# Patient Record
Sex: MALE | Employment: FULL TIME | URBAN - METROPOLITAN AREA
[De-identification: names, ages, dates, MRNs, and addresses within clinical notes are randomized per-mention and may not be internally consistent; named-entity substitution may affect disease eponyms.]

---

## 2018-03-12 ENCOUNTER — HOSPITAL ENCOUNTER (OUTPATIENT)
Age: 33
Setting detail: OBSERVATION
Discharge: HOME | DRG: 139 | End: 2018-03-14
Attending: EMERGENCY MEDICINE | Admitting: INTERNAL MEDICINE
Payer: COMMERCIAL

## 2018-03-12 ENCOUNTER — APPOINTMENT (OUTPATIENT)
Dept: GENERAL RADIOLOGY | Age: 33
DRG: 139 | End: 2018-03-12
Attending: EMERGENCY MEDICINE
Payer: COMMERCIAL

## 2018-03-12 DIAGNOSIS — J18.9 COMMUNITY ACQUIRED PNEUMONIA, UNSPECIFIED LATERALITY: Primary | ICD-10-CM

## 2018-03-12 DIAGNOSIS — J10.1 INFLUENZA A: ICD-10-CM

## 2018-03-12 LAB
ANION GAP SERPL CALC-SCNC: 7 MMOL/L (ref 5–15)
BASOPHILS # BLD: 0 K/UL (ref 0–0.1)
BASOPHILS NFR BLD: 0 % (ref 0–1)
BUN SERPL-MCNC: 11 MG/DL (ref 6–20)
BUN/CREAT SERPL: 11 (ref 12–20)
CALCIUM SERPL-MCNC: 8.6 MG/DL (ref 8.5–10.1)
CHLORIDE SERPL-SCNC: 103 MMOL/L (ref 97–108)
CO2 SERPL-SCNC: 27 MMOL/L (ref 21–32)
CREAT SERPL-MCNC: 1.02 MG/DL (ref 0.7–1.3)
D DIMER PPP FEU-MCNC: 0.38 MG/L FEU (ref 0–0.65)
DIFFERENTIAL METHOD BLD: ABNORMAL
EOSINOPHIL # BLD: 0.1 K/UL (ref 0–0.4)
EOSINOPHIL NFR BLD: 1 % (ref 0–7)
ERYTHROCYTE [DISTWIDTH] IN BLOOD BY AUTOMATED COUNT: 12.9 % (ref 11.5–14.5)
GLUCOSE SERPL-MCNC: 114 MG/DL (ref 65–100)
HCT VFR BLD AUTO: 41.4 % (ref 36.6–50.3)
HGB BLD-MCNC: 14.5 G/DL (ref 12.1–17)
IMM GRANULOCYTES # BLD: 0 K/UL (ref 0–0.04)
IMM GRANULOCYTES NFR BLD AUTO: 0 % (ref 0–0.5)
LACTATE BLD-SCNC: 1.9 MMOL/L (ref 0.4–2)
LYMPHOCYTES # BLD: 0.6 K/UL (ref 0.8–3.5)
LYMPHOCYTES NFR BLD: 12 % (ref 12–49)
MCH RBC QN AUTO: 27.9 PG (ref 26–34)
MCHC RBC AUTO-ENTMCNC: 35 G/DL (ref 30–36.5)
MCV RBC AUTO: 79.8 FL (ref 80–99)
MONOCYTES # BLD: 0.7 K/UL (ref 0–1)
MONOCYTES NFR BLD: 14 % (ref 5–13)
NEUTS SEG # BLD: 3.8 K/UL (ref 1.8–8)
NEUTS SEG NFR BLD: 73 % (ref 32–75)
NRBC # BLD: 0 K/UL (ref 0–0.01)
NRBC BLD-RTO: 0 PER 100 WBC
PLATELET # BLD AUTO: 189 K/UL (ref 150–400)
PMV BLD AUTO: 10 FL (ref 8.9–12.9)
POTASSIUM SERPL-SCNC: 3.4 MMOL/L (ref 3.5–5.1)
RBC # BLD AUTO: 5.19 M/UL (ref 4.1–5.7)
RBC MORPH BLD: ABNORMAL
S PYO AG THROAT QL: NEGATIVE
SODIUM SERPL-SCNC: 137 MMOL/L (ref 136–145)
WBC # BLD AUTO: 5.2 K/UL (ref 4.1–11.1)

## 2018-03-12 PROCEDURE — 93005 ELECTROCARDIOGRAM TRACING: CPT

## 2018-03-12 PROCEDURE — 85379 FIBRIN DEGRADATION QUANT: CPT | Performed by: PHYSICIAN ASSISTANT

## 2018-03-12 PROCEDURE — 85025 COMPLETE CBC W/AUTO DIFF WBC: CPT | Performed by: PHYSICIAN ASSISTANT

## 2018-03-12 PROCEDURE — 74011636637 HC RX REV CODE- 636/637: Performed by: PHYSICIAN ASSISTANT

## 2018-03-12 PROCEDURE — 71046 X-RAY EXAM CHEST 2 VIEWS: CPT

## 2018-03-12 PROCEDURE — 94761 N-INVAS EAR/PLS OXIMETRY MLT: CPT

## 2018-03-12 PROCEDURE — 36415 COLL VENOUS BLD VENIPUNCTURE: CPT | Performed by: PHYSICIAN ASSISTANT

## 2018-03-12 PROCEDURE — 99285 EMERGENCY DEPT VISIT HI MDM: CPT

## 2018-03-12 PROCEDURE — 96374 THER/PROPH/DIAG INJ IV PUSH: CPT

## 2018-03-12 PROCEDURE — 74011250636 HC RX REV CODE- 250/636: Performed by: PHYSICIAN ASSISTANT

## 2018-03-12 PROCEDURE — 94640 AIRWAY INHALATION TREATMENT: CPT

## 2018-03-12 PROCEDURE — 74011000250 HC RX REV CODE- 250: Performed by: PHYSICIAN ASSISTANT

## 2018-03-12 PROCEDURE — 96375 TX/PRO/DX INJ NEW DRUG ADDON: CPT

## 2018-03-12 PROCEDURE — 83605 ASSAY OF LACTIC ACID: CPT

## 2018-03-12 PROCEDURE — 87880 STREP A ASSAY W/OPTIC: CPT

## 2018-03-12 PROCEDURE — 96361 HYDRATE IV INFUSION ADD-ON: CPT

## 2018-03-12 PROCEDURE — 87804 INFLUENZA ASSAY W/OPTIC: CPT | Performed by: PHYSICIAN ASSISTANT

## 2018-03-12 PROCEDURE — 80048 BASIC METABOLIC PNL TOTAL CA: CPT | Performed by: PHYSICIAN ASSISTANT

## 2018-03-12 PROCEDURE — 74011250637 HC RX REV CODE- 250/637: Performed by: PHYSICIAN ASSISTANT

## 2018-03-12 RX ORDER — KETOROLAC TROMETHAMINE 30 MG/ML
30 INJECTION, SOLUTION INTRAMUSCULAR; INTRAVENOUS
Status: COMPLETED | OUTPATIENT
Start: 2018-03-12 | End: 2018-03-12

## 2018-03-12 RX ORDER — AZITHROMYCIN 250 MG/1
250 TABLET, FILM COATED ORAL DAILY
Qty: 4 TAB | Refills: 0 | Status: SHIPPED | OUTPATIENT
Start: 2018-03-12 | End: 2018-03-16

## 2018-03-12 RX ORDER — ACETAMINOPHEN 325 MG/1
650 TABLET ORAL
Qty: 20 TAB | Refills: 0 | Status: SHIPPED | OUTPATIENT
Start: 2018-03-12 | End: 2018-03-26

## 2018-03-12 RX ORDER — IBUPROFEN 600 MG/1
600 TABLET ORAL
Qty: 20 TAB | Refills: 0 | Status: SHIPPED | OUTPATIENT
Start: 2018-03-12 | End: 2018-03-26

## 2018-03-12 RX ORDER — AZITHROMYCIN 250 MG/1
500 TABLET, FILM COATED ORAL
Status: COMPLETED | OUTPATIENT
Start: 2018-03-12 | End: 2018-03-12

## 2018-03-12 RX ORDER — PREDNISONE 20 MG/1
60 TABLET ORAL
Status: COMPLETED | OUTPATIENT
Start: 2018-03-12 | End: 2018-03-12

## 2018-03-12 RX ADMIN — ALBUTEROL SULFATE 1 DOSE: 2.5 SOLUTION RESPIRATORY (INHALATION) at 21:33

## 2018-03-12 RX ADMIN — KETOROLAC TROMETHAMINE 30 MG: 30 INJECTION, SOLUTION INTRAMUSCULAR at 22:48

## 2018-03-12 RX ADMIN — SODIUM CHLORIDE 1000 ML: 900 INJECTION, SOLUTION INTRAVENOUS at 22:59

## 2018-03-12 RX ADMIN — AZITHROMYCIN 500 MG: 250 TABLET, FILM COATED ORAL at 22:45

## 2018-03-12 RX ADMIN — SODIUM CHLORIDE 1000 ML: 900 INJECTION, SOLUTION INTRAVENOUS at 21:47

## 2018-03-12 RX ADMIN — PREDNISONE 60 MG: 20 TABLET ORAL at 21:47

## 2018-03-12 NOTE — IP AVS SNAPSHOT
[address] 
[phone] Patient: Jesus Jacob MRN: [id number] :1985 You are allergic to the following No active allergies Immunizations Administered for This Admission Name Date Influenza Vaccine (Quad) PF 3/14/2018 Recent Documentation Height Weight BMI Smoking Status 1.676 m 81.2 kg 28.89 kg/m2 Current Every Day Smoker Unresulted Labs-Please follow up with your PCP about these lab tests Order Current Status CULTURE, BLOOD, PAIRED Preliminary result Emergency Contacts  (Rel.) Home Phone Work Phone Mobile Phone None,None (Pt Representative) [phone] -- -- About your hospitalization You were admitted on:  2018 You last received care in the:  [facility] You were discharged on:  2018 Why you were hospitalized Your primary diagnosis was:  Sepsis (Hcc) Providers Seen During Your Hospitalization Provider Specialty Primary office phone Kyaw Aden MD Emergency Medicine [phone] India Rubin MD Internal Medicine [phone] Ariela Sandoval MD Hospitalist [phone] Your Primary Care Physician (PCP) Primary Care Physician Office Phone Office Fax NONE ** None ** ** None ** Follow-up Information Follow up With Details Comments Contact Info Orestes Spicer NP On 3/20/2018 Hospital follow up new PCP appt on Tuesday, 3/20/18 @ 2:00 p.m. Patient needs to arrive 15 minutes early with photo ID, insurance card and a listing of all medications. [address] [place] [address] 
[phone] Appointments for Next 14 days 3/20/2018  2:00 PM  [facility] My Medications TAKE these medications as instructed  Instructions Each Dose to Equal  
 Morning Noon Evening Bedtime * TYLENOL PO Your last dose was: Your next dose is: Take 1 Tab by mouth every four (4) hours as needed for Pain. 1 Tab * acetaminophen 325 mg tablet Commonly known as:  TYLENOL Your last dose was: Your next dose is: Take 2 Tabs by mouth every four (4) hours as needed for Pain. 650 mg  
    
   
   
   
  
 azithromycin 250 mg tablet Commonly known as:  Dala Parminder Your last dose was: Your next dose is: Take 1 Tab by mouth daily for 4 days. 250 mg  
    
   
   
   
  
 ibuprofen 600 mg tablet Commonly known as:  MOTRIN Your last dose was: Your next dose is: Take 1 Tab by mouth every six (6) hours as needed for Pain. 600 mg  
    
   
   
   
  
 levoFLOXacin 500 mg tablet Commonly known as:  Bayron Christiansen Start taking on:  3/15/2018 Your last dose was: Your next dose is: Take 1 Tab by mouth every twenty-four (24) hours. 500 mg  
    
   
   
   
  
 nicotine 21 mg/24 hr  
Commonly known as:  Lizette Railing Start taking on:  3/15/2018 Your last dose was: Your next dose is:    
   
   
 1 Patch by TransDERmal route every twenty-four (24) hours for 30 days. 1 Patch  
    
   
   
   
  
 oseltamivir 75 mg capsule Commonly known as:  TAMIFLU Your last dose was: Your next dose is: Take 1 Cap by mouth two (2) times a day for 3 days. 75 mg * Notice: This list has 2 medication(s) that are the same as other medications prescribed for you. Read the directions carefully, and ask your doctor or other care provider to review them with you. Where to Get Your Medications Information on where to get these meds will be given to you by the nurse or doctor. ! Ask your nurse or doctor about these medications  
  acetaminophen 325 mg tablet azithromycin 250 mg tablet  
 ibuprofen 600 mg tablet  
 levoFLOXacin 500 mg tablet  
 nicotine 21 mg/24 hr  
 oseltamivir 75 mg capsule Discharge Instructions Discharge Instructions You came in with the flu and a pneumonia. We have treated you with IV antibiotics and hydration. Please finish your course of antibiotics and flu medicine. It is important that you see a doctor for follow-up within one week of discharge. See more information on the flu below. PATIENT ID: Kasandra Telles MRN: [id number] YOB: 1985 DATE OF ADMISSION: 3/12/2018  8:44 PM   
DATE OF DISCHARGE: 3/14/2018 PRIMARY CARE PROVIDER: None ATTENDING PHYSICIAN: Vernon Balbuena MD 
DISCHARGING PROVIDER: Vernon Balbuena MD   
To contact this individual call [phone] and ask the  to page. If unavailable ask to be transferred the Adult Hospitalist Department. DISCHARGE DIAGNOSES flu and pneumonia CONSULTATIONS: IP CONSULT TO HOSPITALIST PENDING TEST RESULTS:  
At the time of discharge the following test results are still pending: final Micro FOLLOW UP APPOINTMENTS:  
Follow-up Information Follow up With Details Comments Contact Info None   None ([phone]) Patient stated that they have no PCP 
  
  
 YOU TOLD ME THAT YOUR KIDS HAVE A FAMILY DOCTOR AND YOU WILL FOLLOW-UP WITH THEM. PLEASE MAKE AN APPOINTMENT FOR WITHIN THE NEXT WEEK. ADDITIONAL CARE RECOMMENDATIONS:  
 
DIET: Regular Diet ACTIVITY: Activity as tolerated DISCHARGE MEDICATIONS: 
 See Medication Reconciliation Form · It is important that you take the medication exactly as they are prescribed. · Keep your medication in the bottles provided by the pharmacist and keep a list of the medication names, dosages, and times to be taken in your wallet. · Do not take other medications without consulting your doctor.   
 
 
NOTIFY YOUR PHYSICIAN FOR ANY OF THE FOLLOWING:  
 Fever over 101 degrees for 24 hours. Chest pain, shortness of breath, fever, chills, nausea, vomiting, diarrhea, change in mentation, falling, weakness, bleeding. Severe pain or pain not relieved by medications. Or, any other signs or symptoms that you may have questions about. DISPOSITION: 
  Home With: 
 OT  PT  [facility]  RN  
  
 SNF/Inpatient Rehab/LTAC Independent/assisted living Hospice Other:  
 
 
Signed:  
Marysol Patterson MD 
3/14/2018 
3:19 PM 
Influenza (Flu): Care Instructions Your Care Instructions Influenza (flu) is an infection in the lungs and breathing passages. It is caused by the influenza virus. There are different strains, or types, of the flu virus from year to year. Unlike the common cold, the flu comes on suddenly and the symptoms, such as a cough, congestion, fever, chills, fatigue, aches, and pains, are more severe. These symptoms may last up to 10 days. Although the flu can make you feel very sick, it usually doesn't cause serious health problems. Home treatment is usually all you need for flu symptoms. But your doctor may prescribe antiviral medicine to prevent other health problems, such as pneumonia, from developing. Older people and those who have a long-term health condition, such as lung disease, are most at risk for having pneumonia or other health problems. Follow-up care is a key part of your treatment and safety. Be sure to make and go to all appointments, and call your doctor if you are having problems. It's also a good idea to know your test results and keep a list of the medicines you take. How can you care for yourself at home? · Get plenty of rest. 
· Drink plenty of fluids, enough so that your urine is light yellow or clear like water. If you have kidney, heart, or liver disease and have to limit fluids, talk with your doctor before you increase the amount of fluids you drink.  
· Take an over-the-counter pain medicine if needed, such as acetaminophen (Tylenol), ibuprofen (Advil, Motrin), or naproxen (Aleve), to relieve fever, headache, and muscle aches. Read and follow all instructions on the label. No one younger than 20 should take aspirin. It has been linked to Reye syndrome, a serious illness. · Do not smoke. Smoking can make the flu worse. If you need help quitting, talk to your doctor about stop-smoking programs and medicines. These can increase your chances of quitting for good. · Breathe moist air from a hot shower or from a sink filled with hot water to help clear a stuffy nose. · Before you use cough and cold medicines, check the label. These medicines may not be safe for young children or for people with certain health problems. · If the skin around your nose and lips becomes sore, put some petroleum jelly on the area. · To ease coughing: ¨ Drink fluids to soothe a scratchy throat. ¨ Suck on cough drops or plain hard candy. ¨ Take an over-the-counter cough medicine that contains dextromethorphan to help you get some sleep. Read and follow all instructions on the label. ¨ Raise your head at night with an extra pillow. This may help you rest if coughing keeps you awake. · Take any prescribed medicine exactly as directed. Call your doctor if you think you are having a problem with your medicine. To avoid spreading the flu · Wash your hands regularly, and keep your hands away from your face. · Stay home from school, work, and other public places until you are feeling better and your fever has been gone for at least 24 hours. The fever needs to have gone away on its own without the help of medicine. · Ask people living with you to talk to their doctors about preventing the flu. They may get antiviral medicine to keep from getting the flu from you. · To prevent the flu in the future, get a flu vaccine every fall. Encourage people living with you to get the vaccine. · Cover your mouth when you cough or sneeze.  
·  
 When should you call for help? Call 911 anytime you think you may need emergency care. For example, call if: 
? · You have severe trouble breathing. ?Call your doctor now or seek immediate medical care if: 
? · You have new or worse trouble breathing. ? · You seem to be getting much sicker. ? · You feel very sleepy or confused. ? · You have a new or higher fever. ? · You get a new rash. ? Watch closely for changes in your health, and be sure to contact your doctor if: 
? · You begin to get better and then get worse. ? · You are not getting better after 1 week. Where can you learn more? Go to http://jeanne-eyad.info/. Enter C575 in the search box to learn more about \"Influenza (Flu): Care Instructions. \" Current as of: May 12, 2017 Content Version: 11.4 © 0430-9436 [employer]. Care instructions adapted under license by [employer] (which disclaims liability or warranty for this information). If you have questions about a medical condition or this instruction, always ask your healthcare professional. [place] [address] any warranty or liability for your use of this information. Discharge Orders None [email or web address] Announcement We are excited to announce that we are making your provider's discharge notes available to you in [email or web address]. You will see these notes when they are completed and signed by the physician that discharged you from your recent hospital stay. If you have any questions or concerns about any information you see in [email or web address], please call the Health Information Department where you were seen or reach out to your Primary Care Provider for more information about your plan of care. Introducing [facility]! [employer] introduces [email or web address] patient portal. Now you can access parts of your medical record, email your doctor's office, and request medication refills online.    
 
1. In your internet browser, go to [email or web address] 2. Click on the First Time User? Click Here link in the Sign In box. You will see the New Member Sign Up page. 3. Enter your [employer] Access Code exactly as it appears below. You will not need to use this code after youve completed the sign-up process. If you do not sign up before the expiration date, you must request a new code. · [employer] Access Code: [email or web address] Expires: 6/10/2018  9:38 PM 
 
4. Enter the last four digits of your Social Security Number (xxxx) and Date of Birth (mm/dd/yyyy) as indicated and click Submit. You will be taken to the next sign-up page. 5. Create a [employer] ID. This will be your [employer] login ID and cannot be changed, so think of one that is secure and easy to remember. 6. Create a [employer] password. You can change your password at any time. 7. Enter your Password Reset Question and Answer. This can be used at a later time if you forget your password. 8. Enter your e-mail address. You will receive e-mail notification when new information is available in [address] 9. Click Sign Up. You can now view and download portions of your medical record. 10. Click the Download Summary menu link to download a portable copy of your medical information. If you have questions, please visit the Frequently Asked Questions section of the [employer] website. Remember, [employer] is NOT to be used for urgent needs. For medical emergencies, dial 911. Now available from your iPhone and Android! General Information Please provide this summary of care documentation to your next provider. Patient Signature:  ____________________________________________________________ Date:  ____________________________________________________________  
  
Serge Officer Provider Signature:  ____________________________________________________________ Date:  ____________________________________________________________

## 2018-03-13 PROBLEM — A41.9 SEPSIS (HCC): Status: ACTIVE | Noted: 2018-03-13

## 2018-03-13 LAB
ALBUMIN SERPL-MCNC: 3 G/DL (ref 3.5–5)
ALBUMIN/GLOB SERPL: 0.9 {RATIO} (ref 1.1–2.2)
ALP SERPL-CCNC: 55 U/L (ref 45–117)
ALT SERPL-CCNC: 40 U/L (ref 12–78)
AMYLASE SERPL-CCNC: 40 U/L (ref 25–115)
ANION GAP SERPL CALC-SCNC: 7 MMOL/L (ref 5–15)
AST SERPL-CCNC: 61 U/L (ref 15–37)
ATRIAL RATE: 96 BPM
BASOPHILS # BLD: 0 K/UL (ref 0–0.1)
BASOPHILS NFR BLD: 0 % (ref 0–1)
BILIRUB SERPL-MCNC: 0.3 MG/DL (ref 0.2–1)
BUN SERPL-MCNC: 13 MG/DL (ref 6–20)
BUN/CREAT SERPL: 14 (ref 12–20)
CALCIUM SERPL-MCNC: 7.6 MG/DL (ref 8.5–10.1)
CALCULATED P AXIS, ECG09: 77 DEGREES
CALCULATED R AXIS, ECG10: 49 DEGREES
CALCULATED T AXIS, ECG11: 34 DEGREES
CHLORIDE SERPL-SCNC: 111 MMOL/L (ref 97–108)
CHOLEST SERPL-MCNC: 99 MG/DL
CO2 SERPL-SCNC: 22 MMOL/L (ref 21–32)
CREAT SERPL-MCNC: 0.96 MG/DL (ref 0.7–1.3)
DIAGNOSIS, 93000: NORMAL
DIFFERENTIAL METHOD BLD: ABNORMAL
EOSINOPHIL # BLD: 0 K/UL (ref 0–0.4)
EOSINOPHIL NFR BLD: 0 % (ref 0–7)
ERYTHROCYTE [DISTWIDTH] IN BLOOD BY AUTOMATED COUNT: 13.1 % (ref 11.5–14.5)
FLUAV AG NPH QL IA: POSITIVE
FLUBV AG NOSE QL IA: NEGATIVE
GLOBULIN SER CALC-MCNC: 3.2 G/DL (ref 2–4)
GLUCOSE SERPL-MCNC: 222 MG/DL (ref 65–100)
HCT VFR BLD AUTO: 36.2 % (ref 36.6–50.3)
HDLC SERPL-MCNC: 34 MG/DL
HDLC SERPL: 2.9 {RATIO} (ref 0–5)
HGB BLD-MCNC: 12.5 G/DL (ref 12.1–17)
IMM GRANULOCYTES # BLD: 0 K/UL (ref 0–0.04)
IMM GRANULOCYTES NFR BLD AUTO: 0 % (ref 0–0.5)
LDLC SERPL CALC-MCNC: 53.6 MG/DL (ref 0–100)
LIPASE SERPL-CCNC: 129 U/L (ref 73–393)
LIPID PROFILE,FLP: NORMAL
LYMPHOCYTES # BLD: 0.5 K/UL (ref 0.8–3.5)
LYMPHOCYTES NFR BLD: 8 % (ref 12–49)
MAGNESIUM SERPL-MCNC: 2.1 MG/DL (ref 1.6–2.4)
MCH RBC QN AUTO: 28.4 PG (ref 26–34)
MCHC RBC AUTO-ENTMCNC: 34.5 G/DL (ref 30–36.5)
MCV RBC AUTO: 82.3 FL (ref 80–99)
MONOCYTES # BLD: 0.1 K/UL (ref 0–1)
MONOCYTES NFR BLD: 2 % (ref 5–13)
NEUTS SEG # BLD: 5.1 K/UL (ref 1.8–8)
NEUTS SEG NFR BLD: 90 % (ref 32–75)
NRBC # BLD: 0 K/UL (ref 0–0.01)
NRBC BLD-RTO: 0 PER 100 WBC
P-R INTERVAL, ECG05: 154 MS
PHOSPHATE SERPL-MCNC: 2.2 MG/DL (ref 2.6–4.7)
PLATELET # BLD AUTO: 159 K/UL (ref 150–400)
PMV BLD AUTO: 10.2 FL (ref 8.9–12.9)
POTASSIUM SERPL-SCNC: 4.4 MMOL/L (ref 3.5–5.1)
PROT SERPL-MCNC: 6.2 G/DL (ref 6.4–8.2)
Q-T INTERVAL, ECG07: 332 MS
QRS DURATION, ECG06: 90 MS
QTC CALCULATION (BEZET), ECG08: 419 MS
RBC # BLD AUTO: 4.4 M/UL (ref 4.1–5.7)
RBC MORPH BLD: ABNORMAL
SODIUM SERPL-SCNC: 140 MMOL/L (ref 136–145)
TRIGL SERPL-MCNC: 57 MG/DL (ref ?–150)
TSH SERPL DL<=0.05 MIU/L-ACNC: 0.35 UIU/ML (ref 0.36–3.74)
VENTRICULAR RATE, ECG03: 96 BPM
VLDLC SERPL CALC-MCNC: 11.4 MG/DL
WBC # BLD AUTO: 5.7 K/UL (ref 4.1–11.1)

## 2018-03-13 PROCEDURE — 80053 COMPREHEN METABOLIC PANEL: CPT | Performed by: INTERNAL MEDICINE

## 2018-03-13 PROCEDURE — 80061 LIPID PANEL: CPT | Performed by: INTERNAL MEDICINE

## 2018-03-13 PROCEDURE — 96367 TX/PROPH/DG ADDL SEQ IV INF: CPT

## 2018-03-13 PROCEDURE — 74011250636 HC RX REV CODE- 250/636: Performed by: PHYSICIAN ASSISTANT

## 2018-03-13 PROCEDURE — 85025 COMPLETE CBC W/AUTO DIFF WBC: CPT | Performed by: INTERNAL MEDICINE

## 2018-03-13 PROCEDURE — 99218 HC RM OBSERVATION: CPT

## 2018-03-13 PROCEDURE — 84443 ASSAY THYROID STIM HORMONE: CPT | Performed by: INTERNAL MEDICINE

## 2018-03-13 PROCEDURE — 74011000258 HC RX REV CODE- 258: Performed by: INTERNAL MEDICINE

## 2018-03-13 PROCEDURE — 96361 HYDRATE IV INFUSION ADD-ON: CPT

## 2018-03-13 PROCEDURE — 83735 ASSAY OF MAGNESIUM: CPT | Performed by: INTERNAL MEDICINE

## 2018-03-13 PROCEDURE — 82150 ASSAY OF AMYLASE: CPT | Performed by: INTERNAL MEDICINE

## 2018-03-13 PROCEDURE — 74011000258 HC RX REV CODE- 258: Performed by: PHYSICIAN ASSISTANT

## 2018-03-13 PROCEDURE — 96365 THER/PROPH/DIAG IV INF INIT: CPT

## 2018-03-13 PROCEDURE — 87040 BLOOD CULTURE FOR BACTERIA: CPT | Performed by: PHYSICIAN ASSISTANT

## 2018-03-13 PROCEDURE — 83690 ASSAY OF LIPASE: CPT | Performed by: INTERNAL MEDICINE

## 2018-03-13 PROCEDURE — 74011250637 HC RX REV CODE- 250/637: Performed by: INTERNAL MEDICINE

## 2018-03-13 PROCEDURE — 96372 THER/PROPH/DIAG INJ SC/IM: CPT

## 2018-03-13 PROCEDURE — 96366 THER/PROPH/DIAG IV INF ADDON: CPT

## 2018-03-13 PROCEDURE — 74011250637 HC RX REV CODE- 250/637: Performed by: PHYSICIAN ASSISTANT

## 2018-03-13 PROCEDURE — 36415 COLL VENOUS BLD VENIPUNCTURE: CPT | Performed by: PHYSICIAN ASSISTANT

## 2018-03-13 PROCEDURE — 65270000029 HC RM PRIVATE

## 2018-03-13 PROCEDURE — 84100 ASSAY OF PHOSPHORUS: CPT | Performed by: INTERNAL MEDICINE

## 2018-03-13 PROCEDURE — 74011250636 HC RX REV CODE- 250/636: Performed by: INTERNAL MEDICINE

## 2018-03-13 RX ORDER — VANCOMYCIN/0.9 % SOD CHLORIDE 1.5G/250ML
1500 PLASTIC BAG, INJECTION (ML) INTRAVENOUS EVERY 12 HOURS
Status: DISCONTINUED | OUTPATIENT
Start: 2018-03-13 | End: 2018-03-14

## 2018-03-13 RX ORDER — IPRATROPIUM BROMIDE AND ALBUTEROL SULFATE 2.5; .5 MG/3ML; MG/3ML
3 SOLUTION RESPIRATORY (INHALATION)
Status: DISCONTINUED | OUTPATIENT
Start: 2018-03-13 | End: 2018-03-14 | Stop reason: HOSPADM

## 2018-03-13 RX ORDER — IBUPROFEN 200 MG
1 TABLET ORAL EVERY 24 HOURS
Status: DISCONTINUED | OUTPATIENT
Start: 2018-03-13 | End: 2018-03-14 | Stop reason: HOSPADM

## 2018-03-13 RX ORDER — ONDANSETRON 2 MG/ML
4 INJECTION INTRAMUSCULAR; INTRAVENOUS
Status: DISCONTINUED | OUTPATIENT
Start: 2018-03-13 | End: 2018-03-14 | Stop reason: HOSPADM

## 2018-03-13 RX ORDER — VANCOMYCIN 2 GRAM/500 ML IN 0.9 % SODIUM CHLORIDE INTRAVENOUS
2000 ONCE
Status: COMPLETED | OUTPATIENT
Start: 2018-03-13 | End: 2018-03-13

## 2018-03-13 RX ORDER — OSELTAMIVIR PHOSPHATE 75 MG/1
75 CAPSULE ORAL 2 TIMES DAILY
Status: DISCONTINUED | OUTPATIENT
Start: 2018-03-13 | End: 2018-03-14 | Stop reason: HOSPADM

## 2018-03-13 RX ORDER — DOCUSATE SODIUM 100 MG/1
100 CAPSULE, LIQUID FILLED ORAL 2 TIMES DAILY
Status: DISCONTINUED | OUTPATIENT
Start: 2018-03-13 | End: 2018-03-14 | Stop reason: HOSPADM

## 2018-03-13 RX ORDER — ACETAMINOPHEN 325 MG/1
975 TABLET ORAL
Status: COMPLETED | OUTPATIENT
Start: 2018-03-13 | End: 2018-03-13

## 2018-03-13 RX ORDER — OSELTAMIVIR PHOSPHATE 75 MG/1
75 CAPSULE ORAL
Status: COMPLETED | OUTPATIENT
Start: 2018-03-13 | End: 2018-03-13

## 2018-03-13 RX ORDER — SODIUM CHLORIDE 0.9 % (FLUSH) 0.9 %
5-10 SYRINGE (ML) INJECTION AS NEEDED
Status: DISCONTINUED | OUTPATIENT
Start: 2018-03-13 | End: 2018-03-14 | Stop reason: HOSPADM

## 2018-03-13 RX ORDER — ACETAMINOPHEN 325 MG/1
325 TABLET ORAL
COMMUNITY
End: 2018-03-13

## 2018-03-13 RX ORDER — ACETAMINOPHEN 325 MG/1
650 TABLET ORAL
Status: DISCONTINUED | OUTPATIENT
Start: 2018-03-13 | End: 2018-03-14 | Stop reason: HOSPADM

## 2018-03-13 RX ORDER — SODIUM CHLORIDE 0.9 % (FLUSH) 0.9 %
5-10 SYRINGE (ML) INJECTION EVERY 8 HOURS
Status: DISCONTINUED | OUTPATIENT
Start: 2018-03-13 | End: 2018-03-14 | Stop reason: HOSPADM

## 2018-03-13 RX ORDER — ENOXAPARIN SODIUM 100 MG/ML
40 INJECTION SUBCUTANEOUS EVERY 24 HOURS
Status: DISCONTINUED | OUTPATIENT
Start: 2018-03-13 | End: 2018-03-14 | Stop reason: HOSPADM

## 2018-03-13 RX ORDER — SODIUM CHLORIDE AND POTASSIUM CHLORIDE .9; .15 G/100ML; G/100ML
SOLUTION INTRAVENOUS CONTINUOUS
Status: DISCONTINUED | OUTPATIENT
Start: 2018-03-13 | End: 2018-03-14 | Stop reason: HOSPADM

## 2018-03-13 RX ADMIN — ENOXAPARIN SODIUM 40 MG: 40 INJECTION SUBCUTANEOUS at 04:04

## 2018-03-13 RX ADMIN — PIPERACILLIN SODIUM,TAZOBACTAM SODIUM 3.38 G: 3; .375 INJECTION, POWDER, FOR SOLUTION INTRAVENOUS at 10:42

## 2018-03-13 RX ADMIN — PIPERACILLIN SODIUM,TAZOBACTAM SODIUM 3.38 G: 3; .375 INJECTION, POWDER, FOR SOLUTION INTRAVENOUS at 03:55

## 2018-03-13 RX ADMIN — VANCOMYCIN HYDROCHLORIDE 1500 MG: 10 INJECTION, POWDER, LYOPHILIZED, FOR SOLUTION INTRAVENOUS at 16:07

## 2018-03-13 RX ADMIN — CEFTRIAXONE 1 G: 1 INJECTION, POWDER, FOR SOLUTION INTRAMUSCULAR; INTRAVENOUS at 02:30

## 2018-03-13 RX ADMIN — Medication 1 CAPSULE: at 11:42

## 2018-03-13 RX ADMIN — PIPERACILLIN SODIUM,TAZOBACTAM SODIUM 3.38 G: 3; .375 INJECTION, POWDER, FOR SOLUTION INTRAVENOUS at 20:16

## 2018-03-13 RX ADMIN — Medication 10 ML: at 14:34

## 2018-03-13 RX ADMIN — POTASSIUM CHLORIDE AND SODIUM CHLORIDE: 900; 150 INJECTION, SOLUTION INTRAVENOUS at 11:43

## 2018-03-13 RX ADMIN — POTASSIUM CHLORIDE AND SODIUM CHLORIDE: 900; 150 INJECTION, SOLUTION INTRAVENOUS at 04:03

## 2018-03-13 RX ADMIN — OSELTAMIVIR PHOSPHATE 75 MG: 75 CAPSULE ORAL at 01:45

## 2018-03-13 RX ADMIN — OSELTAMIVIR PHOSPHATE 75 MG: 75 CAPSULE ORAL at 20:11

## 2018-03-13 RX ADMIN — ACETAMINOPHEN 975 MG: 325 TABLET, FILM COATED ORAL at 01:45

## 2018-03-13 RX ADMIN — SODIUM CHLORIDE 1000 ML: 900 INJECTION, SOLUTION INTRAVENOUS at 01:55

## 2018-03-13 RX ADMIN — POTASSIUM CHLORIDE AND SODIUM CHLORIDE: 900; 150 INJECTION, SOLUTION INTRAVENOUS at 19:06

## 2018-03-13 RX ADMIN — DOCUSATE SODIUM 100 MG: 100 CAPSULE, LIQUID FILLED ORAL at 10:42

## 2018-03-13 RX ADMIN — OSELTAMIVIR PHOSPHATE 75 MG: 75 CAPSULE ORAL at 11:42

## 2018-03-13 RX ADMIN — ACETAMINOPHEN 650 MG: 325 TABLET, FILM COATED ORAL at 14:30

## 2018-03-13 RX ADMIN — DOCUSATE SODIUM 100 MG: 100 CAPSULE, LIQUID FILLED ORAL at 18:19

## 2018-03-13 RX ADMIN — VANCOMYCIN HYDROCHLORIDE 2000 MG: 10 INJECTION, POWDER, LYOPHILIZED, FOR SOLUTION INTRAVENOUS at 04:18

## 2018-03-13 NOTE — H&P
[address] [facility]  ACUTE CARE HISTORY AND PHYSICAL    Cristhian Singh  MR#: [id number]  : 1985  ACCOUNT #: [de-identified]   DATE OF SERVICE: 2018    Date of admission:  2018. PRIMARY CARE PHYSICIAN:  None. SOURCE OF INFORMATION:  The patient. CHIEF COMPLAINT:  Cough and sore throat. HISTORY OF PRESENT ILLNESS:  This is a 58-year-old man without any significant past medical history, was in his usual state of health until about 2 days ago when the patient developed a cough and sore throat. The symptoms are progressive and getting worse. The cough is productive of a yellowish sputum associated with chest pain and diaphoresis. The chest pain is located at the right side of the chest, described as pleuritic, 7/10 in severity, no radiation. No known aggravating or relieving factors associated with diaphoresis. The patient denies sick contact. No night sweat, no weight loss. The patient denies history of recent travel. He was brought to the emergency room because of worsening symptoms. When the patient arrived by the emergency room, the initial temperature was 99.3. This went up to 100.3, heart rate was 115. Chest x-ray shows evidence of pneumonia. He was also found to be influenza A antigen positive. The patient was started on antibiotics and Tamiflu. He was referred to the hospitalist service for evaluation for admission. The patient has no history of asthma. PAST MEDICAL HISTORY:  Not significant. ALLERGIES:  No known drug allergies. MEDICATIONS:  The patient is not on any medication. FAMILY HISTORY:  This was reviewed. It is not pertinent to present illness. No family history of asthma or congestive heart failure. PAST SURGICAL HISTORY: Not significant. SOCIAL HISTORY:  The patient smokes about a pack of cigarettes daily. Denies alcohol abuse.     REVIEW OF SYSTEMS:  HEENT:  No headache, no dizziness, no blurring of vision, no photophobia. RESPIRATORY SYSTEM:  This is positive for cough, and shortness of breath, no hemoptysis. CARDIOVASCULAR:  Positive for chest pain, no orthopnea, no palpitations. GASTROINTESTINAL:  No nausea, vomiting, no diarrhea, no constipation. GENITOURINARY:  No dysuria. No urgency and no frequency. All other systems are reviewed and they are negative. PHYSICAL EXAMINATION:  GENERAL APPEARANCE:  The patient appeared ill in moderate distress. VITAL SIGNS:  On arrival at the emergency room, temperature 99.3, pulse 115, respiratory rate 20, blood pressure 100/67, oxygen saturation of 97% on room air. HEAD:  Normocephalic, atraumatic. EYES:  Normal eye movement, no redness, no drainage, no discharge. EARS:  Normal external ears with no obvious drainage. NOSE:  No deformity and no drainage. MOUTH AND THROAT:  No visible oral lesions. NECK:  Supple, no JVD, no thyromegaly. CHEST:  A few expiratory wheezing, no crackles. HEART:  Normal S1 and S2 are regular. No clinically appreciable murmur. ABDOMEN:  Soft, nontender, normal bowel sounds. CENTRAL NERVOUS SYSTEM:  Alert, oriented x3. No gross focal neurological deficit. EXTREMITIES:  No edema. Pulses 2+ bilaterally. MUSCULOSKELETAL:  No obvious joint deformity or swelling. SKIN:  No active skin lesions seen in the exposed part of the body. PSYCHIATRIC:  Normal mood and affect. LYMPHATIC SYSTEM:  No cervical lymphadenopathy. DIAGNOSTIC DATA:  EKG shows a sinus rhythm. No ST-T wave abnormalities. Chest x-ray shows mild lingula, mild lingular airspace disease. LABORATORY DATA:  Influenza A antigen positive. Influenza B antigen negative. Lactic acid level 1.9. Hematology:  WBC 5.2, hemoglobin 14.5, hematocrit 41.4, platelets 192. D-dimer 0.38. Chemistry:  Sodium 137, potassium 3.4, chloride 103, CO2 of 27, glucose 114, BUN 11, creatinine 1.02, calcium 8.6. Group A strep negative. ASSESSMENT:  1. Suspected sepsis.   2. Bacterial pneumonia. 3.  Influenza A infection. 4.  Hypokalemia. 5.  Tobacco abuse. PLAN:    1. Suspected sepsis. We will admit the patient for further evaluation and treatment. The diagnosis of sepsis is supported by fever and tachycardia. The patient also developed hypotension in the emergency room. The sepsis is most likely coming from the bacteria pneumonia. In the setting of influenza A infection, we will start the patient on vancomycin and Zosyn. We will await blood culture results. We will check amylase and lipase level to evaluate the patient for possible sources of infection. 2.  Bacterial pneumonia. This is most likely a source of the patient's sepsis. The patient will be started on antibiotics as stated above. The bacterial pneumonia could be as a result of influenza A infection. 3.  Influenza A infection. We will continue with Tamiflu started in the emergency room, will monitor  the patient  clinical response to treatment. 4.  Hypokalemia. We will replace potassium. We will repeat a potassium level. We will also check magnesium level. 5.  Active alcohol abuse. The patient advised to quit smoking completely. The patient on Nicoderm patch. 6. Other issues. CODE STATUS:  The patient is FULL CODE. We will place the patient on Lovenox for DVT prophylaxis.       MD MARCELO Montemayor / JAIME  D: 03/13/2018 02:45     T: 03/13/2018 08:41  JOB #: [id number]

## 2018-03-13 NOTE — ED NOTES
Assumed care of patient. Verbal and bedside report received from [facility], [address] [place] [address]. Patient resting quietly on stretcher, visitor at bedside. Pt appears in no acute distress, respirations equal and unlabored. VS WNL. Call bell within reach.

## 2018-03-13 NOTE — PROGRESS NOTES
Pharmacist Note - Vancomycin Dosing    Consult provided for this 33 y.o. male for indication of possible sepsis. Antibiotic regimen(s): Zosyn and Vancomycin    Recent Labs      18   2130   WBC  5.2   CREA  1.02   BUN  11     Frequency of BMP: daily  Height: 167.6 cm  Weight: 81 kg  Est CrCl: 100 ml/min  Temp (24hrs), Av.5 °F (37.5 °C), Min:98.4 °F (36.9 °C), Max:100.3 °F (37.9 °C)    Cultures:blood      Goal trough = 15 - 20 mcg/mL    Therapy will be initiated with a loading dose of 2000 mg IV x 1 to be followed by a maintenance dose of 1500 mg IV every 12 hours. Pharmacy to follow patient daily and order levels / make dose adjustments as appropriate.

## 2018-03-13 NOTE — ED PROVIDER NOTES
HPI Comments: 33 y.o. male with no significant past medical history who presents from home with chief complaint of cough and sore throat. Pt reports mild cough since yesterday. He developed a sore throat throughout the day. Pt also c/o pleuritic chest pain accompanied by diaphoresis. He has taken Tylenol for his symptoms (last dose: 2100). Pt denies known sick contact. He does not have a hx of asthma. Pt denies any other significant medical concerns. No recent travel or surgery. No known fever. There are no other acute medical concerns at this time. PCP: No primary care provider on file. Note written by Vazquez Baugh, as dictated by KATYA Haley 9:15 PM    The history is provided by the patient. The history is limited by a language barrier. A  was used. History reviewed. No pertinent past medical history. History reviewed. No pertinent surgical history. History reviewed. No pertinent family history. Social History     Social History    Marital status: N/A     Spouse name: N/A    Number of children: N/A    Years of education: N/A     Occupational History    Not on file. Social History Main Topics    Smoking status: Current Every Day Smoker     Packs/day: 1.00    Smokeless tobacco: Never Used    Alcohol use Not on file    Drug use: Not on file    Sexual activity: Not on file     Other Topics Concern    Not on file     Social History Narrative    No narrative on file         ALLERGIES: Review of patient's allergies indicates no known allergies. Review of Systems   Constitutional: Positive for diaphoresis. Negative for fever. HENT: Positive for sore throat. Respiratory: Positive for cough. Cardiovascular: Positive for chest pain. All other systems reviewed and are negative.       Vitals:    03/12/18 2045   BP: 100/67   Pulse: (!) 115   Resp: 20   Temp: 99.3 °F (37.4 °C)   SpO2: 97%   Weight: 81.2 kg (179 lb)            Physical Exam   Constitutional: He is oriented to person, place, and time. He appears well-developed and well-nourished. No distress. HENT:   Head: Normocephalic and atraumatic. Right Ear: External ear normal.   Left Ear: External ear normal.   Nose: Nose normal.   Mouth/Throat: Posterior oropharyngeal erythema (mild) present. No oropharyngeal exudate or tonsillar abscesses. Eyes: Conjunctivae and EOM are normal. Pupils are equal, round, and reactive to light. Right eye exhibits no discharge. Left eye exhibits no discharge. Neck: Normal range of motion. Neck supple. No Brudzinski's sign and no Kernig's sign noted. No meningeal signs   Cardiovascular: Regular rhythm, normal heart sounds and intact distal pulses. Tachycardic 115   Pulmonary/Chest: Effort normal. He has wheezes (expiratory throughout). Abdominal: Soft. Bowel sounds are normal. He exhibits no distension. There is no tenderness. There is no rebound and no guarding. Musculoskeletal: Normal range of motion. He exhibits no edema or tenderness. Neurological: He is alert and oriented to person, place, and time. No cranial nerve deficit. Coordination normal.   Skin: Skin is warm and dry. No rash noted. Psychiatric: He has a normal mood and affect. His behavior is normal. Judgment and thought content normal.   Nursing note and vitals reviewed. Note written by Vazquez Boss, as dictated by KATYA Gallegos 9:19 PM    MDM  Number of Diagnoses or Management Options  Community acquired pneumonia, unspecified laterality:   Influenza A:      Amount and/or Complexity of Data Reviewed  Clinical lab tests: ordered and reviewed  Tests in the radiology section of CPT®: ordered and reviewed  Discuss the patient with other providers: yes          ED Course       Procedures    Patient has been reassessed. Lungs clear after neb; Reviewed labs, medications and radiographics with patient. Will treat with Aaron Simscock. Patient has been reassessed.   BP 80's-90's; will add fluids and reassess. KATYA Martinez    Pt with +Flu/PNE and mild hypotension and tachycardia; will admit. KATYA Martinez    Discussed case with attending Physician Delisa; change of attending at change or shift. Agrees with care and plan. KATYA Martinez    Spoke to Dr Jd Breen; will admit.  KATYA Martinez

## 2018-03-13 NOTE — PROGRESS NOTES
TRANSFER - IN REPORT:    Verbal report received from Gilda(name) on Lizziezhang Clarke  being received from ED(unit) for routine progression of care      Report consisted of patients Situation, Background, Assessment and   Recommendations(SBAR). Information from the following report(s) SBAR, ED Summary, [facility] and Recent Results was reviewed with the receiving nurse. Opportunity for questions and clarification was provided. Assessment completed upon patients arrival to unit and care assumed.

## 2018-03-13 NOTE — ED NOTES
Bedside and Verbal shift change report given to Arland Castleman, RN (oncoming nurse) by Julia Valenzuela (offgoing nurse). Report included the following information SBAR, Kardex, ED Summary, MAR, Recent Results and Cardiac Rhythm NSR.

## 2018-03-13 NOTE — PROGRESS NOTES
Admission Medication Reconciliation:    Information obtained from:  Patient via [facility] Phone    Comments/Recommendations: Updated PTA meds/reviewed patient's allergies. 1)  Patient speaks Croatian and does not understand English very well. Used the [facility] to complete his med rec. 2)  Added Tylenol. Patient does not remember the strength but knows that he took 1 tablet PTA. 3)  Confirmed NKDA with patient. Significant PMH/Disease States:   History reviewed. No pertinent past medical history. Chief Complaint for this Admission:    Chief Complaint   Patient presents with    Cough    Sore Throat       Allergies:  Review of patient's allergies indicates no known allergies. Prior to Admission Medications:   Prior to Admission Medications   Prescriptions Last Dose Informant Patient Reported? Taking? ACETAMINOPHEN (TYLENOL PO) 3/12/2018 at Unknown time  Yes Yes   Sig: Take 1 Tab by mouth every four (4) hours as needed for Pain.       Facility-Administered Medications: None         Written by Diogenes Rowe, PharmD Candidate [id number]

## 2018-03-13 NOTE — ED TRIAGE NOTES
TRIAGE: Pt arrives with c/o cough, sore throat, sob, cp starting yesterday. Appears diaphoretic, took tylenol at 7pm.  Denies n/v/abdominal pain. Korean speaking.

## 2018-03-13 NOTE — ED NOTES
Bedside and Verbal shift change report given to [address] (oncoming nurse) by Leandra Cat RN (offgoing nurse). Report included the following information SBAR, ED Summary, MAR and Recent Results.

## 2018-03-13 NOTE — ED NOTES
TRANSFER - OUT REPORT:    Verbal report given to Telma Shaver RN (name) on Romeo Ochoa  being transferred to [facility] (unit) for routine progression of care       Report consisted of patients Situation, Background, Assessment and   Recommendations(SBAR). Information from the following report(s) SBAR, Kardex, ED Summary, Intake/Output, MAR and Recent Results was reviewed with the receiving nurse. Lines:   Peripheral IV 03/12/18 Right Antecubital (Active)   Site Assessment Clean, dry, & intact 3/12/2018  9:24 PM   Phlebitis Assessment 0 3/12/2018  9:24 PM   Infiltration Assessment 0 3/12/2018  9:24 PM   Dressing Status Clean, dry, & intact 3/12/2018  9:24 PM   Dressing Type Transparent 3/12/2018  9:24 PM   Hub Color/Line Status Pink;Flushed;Patent 3/12/2018  9:24 PM   Action Taken Blood drawn 3/12/2018  9:24 PM       Peripheral IV 03/13/18 Left Antecubital (Active)   Site Assessment Clean 3/13/2018  1:51 AM   Phlebitis Assessment 0 3/13/2018  1:51 AM   Infiltration Assessment 0 3/13/2018  1:51 AM   Dressing Status Clean 3/13/2018  1:51 AM   Dressing Type Transparent 3/13/2018  1:51 AM   Hub Color/Line Status Green 3/13/2018  1:51 AM        Opportunity for questions and clarification was provided.

## 2018-03-13 NOTE — PROGRESS NOTES
Hospitalist Progress Note  Anup Mehta MD  Answering service: [phone] from in house phone  Cell: [phone]      Date of Service:  3/13/2018  NAME:  Azul Parker  :  1985  MRN:  [id number]      Admission Summary: This is a 36-year-old man without any significant past medical history, was in his usual state of health until about 2 days ago when the patient developed a cough and sore throat. The symptoms are progressive and getting worse. The cough is productive of a yellowish sputum associated with chest pain and diaphoresis. The chest pain is located at the right side of the chest, described as pleuritic, 7/10 in severity, no radiation. No known aggravating or relieving factors associated with diaphoresis. The patient denies sick contact. No night sweat, no weight loss. The patient denies history of recent travel. He was brought to the emergency room because of worsening symptoms. When the patient arrived by the emergency room, the initial temperature was 99.3. This went up to 100.3, heart rate was 115. Chest x-ray shows evidence of pneumonia. He was also found to be influenza A antigen positive. The patient was started on antibiotics and Tamiflu. He was referred to the hospitalist service for evaluation for admission. The patient has no history of asthma.     Interval history / Subjective:     Patient stating he feels better this AM. Continues to have cough, but states it has improved. Was aware that he has the flu. Stating he did not get the flu shot and does not have a PCP/has never been to the hospital.     Assessment & Plan:     Sepsis likely secondary to the flu with possible superimposed PNA: The diagnosis of sepsis is supported by fever and tachycardia. The patient also developed hypotension in the emergency room.    -patient with mild lingular airspace disease on imaging  -he was started on Vancomycin and Zosyn in the ED  -plan to narrow based on cultures; consider narrowing to a respiratory fluoroquinolone tomorrow    Influenza A infection: Continue with Tamiflu and supportive care. Hypokalemia, resolved. Current smoker. The patient advised to quit smoking completely. FULL Code  Lovenox    Care Plan discussed with: Patient/Family  Disposition: Home w/Family and TBD     Hospital Problems  Date Reviewed: 3/13/2018          Codes Class Noted POA    * (Principal)Sepsis ([facility] [address] ICD-10-CM: A41.9  ICD-9-CM: 038.9, 995.91  3/13/2018 Yes              Review of Systems:   Pertinent items are noted in HPI. Vital Signs:    Last 24hrs VS reviewed since prior progress note.  Most recent are:  Visit Vitals    /69 (BP 1 Location: Left arm, BP Patient Position: At rest)    Pulse 82    Temp 98.4 °F (36.9 °C)    Resp 23    Ht 5' 6\" (1.676 m)    Wt 81.2 kg (179 lb)    SpO2 99%    BMI 28.89 kg/m2       No intake or output data in the 24 hours ending 03/13/18 0805     Physical Examination:             Constitutional:  No acute distress, cooperative   ENT:  Neck supple   Resp:  Expiratory wheezing   CV:  Regular rhythm, increased rate, no murmurs    GI:  Soft, non distended, non tender    Musculoskeletal:  No edema, warm    Neurologic:  Moves all extremities          Data Review:     Review and/or order of clinical lab test  Review and/or order of tests in the radiology section of CPT  Review and/or order of tests in the medicine section of CPT      Labs:     Recent Labs      03/13/18   0400  03/12/18   2130   WBC  5.7  5.2   HGB  12.5  14.5   HCT  36.2*  41.4   PLT  159  189     Recent Labs      03/13/18   0400  03/12/18   2130   NA  140  137   K  4.4  3.4*   CL  111*  103   CO2  22  27   BUN  13  11   CREA  0.96  1.02   GLU  222*  114*   CA  7.6*  8.6   MG  2.1   --    PHOS  2.2*   --      Recent Labs      03/13/18   0400   SGOT  61*   ALT  40   AP  55   TBILI  0.3   TP  6.2*   ALB  3.0* GLOB  3.2   AML  40   LPSE  129     Lab Results   Component Value Date/Time    Cholesterol, total 99 03/13/2018 04:00 AM    HDL Cholesterol 34 03/13/2018 04:00 AM    LDL, calculated 53.6 03/13/2018 04:00 AM    Triglyceride 57 03/13/2018 04:00 AM    CHOL/HDL Ratio 2.9 03/13/2018 04:00 AM     Medications Reviewed:     Current Facility-Administered Medications   Medication Dose Route Frequency    acetaminophen (TYLENOL) tablet 650 mg  650 mg Oral Q4H PRN    sodium chloride (NS) flush 5-10 mL  5-10 mL IntraVENous Q8H    sodium chloride (NS) flush 5-10 mL  5-10 mL IntraVENous PRN    0.9% sodium chloride with KCl 20 mEq/L infusion   IntraVENous CONTINUOUS    ondansetron (ZOFRAN) injection 4 mg  4 mg IntraVENous Q4H PRN    docusate sodium (COLACE) capsule 100 mg  100 mg Oral BID    nicotine (NICODERM CQ) 21 mg/24 hr patch 1 Patch  1 Patch TransDERmal Q24H    enoxaparin (LOVENOX) injection 40 mg  40 mg SubCUTAneous Q24H    albuterol-ipratropium (DUO-NEB) 2.5 MG-0.5 MG/3 ML  3 mL Nebulization Q4H PRN    lactobac ac& pc-s.therm-b.anim (MARILYN Q/RISAQUAD)  1 Cap Oral DAILY    oseltamivir (TAMIFLU) capsule 75 mg  75 mg Oral BID    VANCOMYCIN INFORMATION NOTE   Other PRN    piperacillin-tazobactam (ZOSYN) 3.375 g in 0.9% sodium chloride (MBP/ADV) 100 mL  3.375 g IntraVENous Q8H    vancomycin (VANCOCIN) 1500 mg in  ml infusion  1,500 mg IntraVENous Q12H     Current Outpatient Prescriptions   Medication Sig    azithromycin (ZITHROMAX) 250 mg tablet Take 1 Tab by mouth daily for 4 days.  ibuprofen (MOTRIN) 600 mg tablet Take 1 Tab by mouth every six (6) hours as needed for Pain.     acetaminophen (TYLENOL) 325 mg tablet Take 2 Tabs by mouth every four (4) hours as needed for Pain.     ______________________________________________________________________  EXPECTED LENGTH OF STAY: - - -  ACTUAL LENGTH OF STAY:          [address] MD

## 2018-03-14 VITALS
HEART RATE: 94 BPM | OXYGEN SATURATION: 99 % | DIASTOLIC BLOOD PRESSURE: 69 MMHG | SYSTOLIC BLOOD PRESSURE: 110 MMHG | HEIGHT: 66 IN | TEMPERATURE: 99.5 F | WEIGHT: 179 LBS | RESPIRATION RATE: 18 BRPM | BODY MASS INDEX: 28.77 KG/M2

## 2018-03-14 PROBLEM — A41.9 SEPSIS (HCC): Status: RESOLVED | Noted: 2018-03-13 | Resolved: 2018-03-14

## 2018-03-14 LAB
ANION GAP SERPL CALC-SCNC: 7 MMOL/L (ref 5–15)
BASOPHILS # BLD: 0 K/UL (ref 0–0.1)
BASOPHILS NFR BLD: 1 % (ref 0–1)
BUN SERPL-MCNC: 12 MG/DL (ref 6–20)
BUN/CREAT SERPL: 11 (ref 12–20)
CALCIUM SERPL-MCNC: 8 MG/DL (ref 8.5–10.1)
CHLORIDE SERPL-SCNC: 105 MMOL/L (ref 97–108)
CO2 SERPL-SCNC: 24 MMOL/L (ref 21–32)
CREAT SERPL-MCNC: 1.13 MG/DL (ref 0.7–1.3)
DIFFERENTIAL METHOD BLD: ABNORMAL
EOSINOPHIL # BLD: 0 K/UL (ref 0–0.4)
EOSINOPHIL NFR BLD: 0 % (ref 0–7)
ERYTHROCYTE [DISTWIDTH] IN BLOOD BY AUTOMATED COUNT: 13.2 % (ref 11.5–14.5)
GLUCOSE SERPL-MCNC: 147 MG/DL (ref 65–100)
HCT VFR BLD AUTO: 39.6 % (ref 36.6–50.3)
HGB BLD-MCNC: 13.6 G/DL (ref 12.1–17)
IMM GRANULOCYTES # BLD: 0 K/UL (ref 0–0.04)
IMM GRANULOCYTES NFR BLD AUTO: 1 % (ref 0–0.5)
LYMPHOCYTES # BLD: 1.2 K/UL (ref 0.8–3.5)
LYMPHOCYTES NFR BLD: 31 % (ref 12–49)
MAGNESIUM SERPL-MCNC: 1.9 MG/DL (ref 1.6–2.4)
MCH RBC QN AUTO: 28 PG (ref 26–34)
MCHC RBC AUTO-ENTMCNC: 34.3 G/DL (ref 30–36.5)
MCV RBC AUTO: 81.6 FL (ref 80–99)
MONOCYTES # BLD: 0.7 K/UL (ref 0–1)
MONOCYTES NFR BLD: 18 % (ref 5–13)
NEUTS SEG # BLD: 2 K/UL (ref 1.8–8)
NEUTS SEG NFR BLD: 50 % (ref 32–75)
NRBC # BLD: 0 K/UL (ref 0–0.01)
NRBC BLD-RTO: 0 PER 100 WBC
PLATELET # BLD AUTO: 162 K/UL (ref 150–400)
PMV BLD AUTO: 10.3 FL (ref 8.9–12.9)
POTASSIUM SERPL-SCNC: 4 MMOL/L (ref 3.5–5.1)
RBC # BLD AUTO: 4.85 M/UL (ref 4.1–5.7)
SODIUM SERPL-SCNC: 136 MMOL/L (ref 136–145)
WBC # BLD AUTO: 4 K/UL (ref 4.1–11.1)

## 2018-03-14 PROCEDURE — 85025 COMPLETE CBC W/AUTO DIFF WBC: CPT | Performed by: INTERNAL MEDICINE

## 2018-03-14 PROCEDURE — 96361 HYDRATE IV INFUSION ADD-ON: CPT

## 2018-03-14 PROCEDURE — 36415 COLL VENOUS BLD VENIPUNCTURE: CPT | Performed by: INTERNAL MEDICINE

## 2018-03-14 PROCEDURE — 90471 IMMUNIZATION ADMIN: CPT

## 2018-03-14 PROCEDURE — 99218 HC RM OBSERVATION: CPT

## 2018-03-14 PROCEDURE — 74011250637 HC RX REV CODE- 250/637: Performed by: INTERNAL MEDICINE

## 2018-03-14 PROCEDURE — 74011250636 HC RX REV CODE- 250/636: Performed by: INTERNAL MEDICINE

## 2018-03-14 PROCEDURE — 83735 ASSAY OF MAGNESIUM: CPT | Performed by: INTERNAL MEDICINE

## 2018-03-14 PROCEDURE — 80048 BASIC METABOLIC PNL TOTAL CA: CPT | Performed by: INTERNAL MEDICINE

## 2018-03-14 PROCEDURE — 74011000258 HC RX REV CODE- 258: Performed by: INTERNAL MEDICINE

## 2018-03-14 PROCEDURE — 96372 THER/PROPH/DIAG INJ SC/IM: CPT

## 2018-03-14 PROCEDURE — 96366 THER/PROPH/DIAG IV INF ADDON: CPT

## 2018-03-14 PROCEDURE — 90686 IIV4 VACC NO PRSV 0.5 ML IM: CPT | Performed by: INTERNAL MEDICINE

## 2018-03-14 RX ORDER — IBUPROFEN 200 MG
1 TABLET ORAL EVERY 24 HOURS
Qty: 30 PATCH | Refills: 0 | Status: SHIPPED | OUTPATIENT
Start: 2018-03-15 | End: 2018-04-14

## 2018-03-14 RX ORDER — LEVOFLOXACIN 500 MG/1
500 TABLET, FILM COATED ORAL EVERY 24 HOURS
Status: DISCONTINUED | OUTPATIENT
Start: 2018-03-14 | End: 2018-03-14 | Stop reason: HOSPADM

## 2018-03-14 RX ORDER — LEVOFLOXACIN 500 MG/1
500 TABLET, FILM COATED ORAL EVERY 24 HOURS
Qty: 4 TAB | Refills: 0 | Status: SHIPPED | OUTPATIENT
Start: 2018-03-15 | End: 2018-03-26

## 2018-03-14 RX ORDER — OSELTAMIVIR PHOSPHATE 75 MG/1
75 CAPSULE ORAL 2 TIMES DAILY
Qty: 6 CAP | Refills: 0 | Status: SHIPPED | OUTPATIENT
Start: 2018-03-14 | End: 2018-03-17

## 2018-03-14 RX ADMIN — DOCUSATE SODIUM 100 MG: 100 CAPSULE, LIQUID FILLED ORAL at 11:22

## 2018-03-14 RX ADMIN — POTASSIUM CHLORIDE AND SODIUM CHLORIDE: 900; 150 INJECTION, SOLUTION INTRAVENOUS at 12:16

## 2018-03-14 RX ADMIN — ENOXAPARIN SODIUM 40 MG: 40 INJECTION SUBCUTANEOUS at 02:40

## 2018-03-14 RX ADMIN — Medication 10 ML: at 12:16

## 2018-03-14 RX ADMIN — ACETAMINOPHEN 650 MG: 325 TABLET, FILM COATED ORAL at 02:41

## 2018-03-14 RX ADMIN — POTASSIUM CHLORIDE AND SODIUM CHLORIDE: 900; 150 INJECTION, SOLUTION INTRAVENOUS at 02:37

## 2018-03-14 RX ADMIN — Medication 1 CAPSULE: at 11:22

## 2018-03-14 RX ADMIN — PIPERACILLIN SODIUM,TAZOBACTAM SODIUM 3.38 G: 3; .375 INJECTION, POWDER, FOR SOLUTION INTRAVENOUS at 02:37

## 2018-03-14 RX ADMIN — INFLUENZA VIRUS VACCINE 0.5 ML: 15; 15; 15; 15 SUSPENSION INTRAMUSCULAR at 18:05

## 2018-03-14 RX ADMIN — LEVOFLOXACIN 500 MG: 500 TABLET, FILM COATED ORAL at 11:22

## 2018-03-14 RX ADMIN — OSELTAMIVIR PHOSPHATE 75 MG: 75 CAPSULE ORAL at 11:22

## 2018-03-14 RX ADMIN — VANCOMYCIN HYDROCHLORIDE 1500 MG: 10 INJECTION, POWDER, LYOPHILIZED, FOR SOLUTION INTRAVENOUS at 04:23

## 2018-03-14 NOTE — ACP (ADVANCE CARE PLANNING)
Request by IB to assist with Advance Medical Directive (AMD). Consulted with patients nurse. After conversation  did not complete AMD at this time, seems to be inappropriate at this time and pt was sleeping.      Anisa Arriaga, [facility]   [phone]

## 2018-03-14 NOTE — DISCHARGE INSTRUCTIONS
Discharge Instructions     You came in with the flu and a pneumonia. We have treated you with IV antibiotics and hydration. Please finish your course of antibiotics and flu medicine. It is important that you see a doctor for follow-up within one week of discharge. See more information on the flu below. PATIENT ID: Marley Rowe  MRN: [id number]   YOB: 1985    DATE OF ADMISSION: 3/12/2018  8:44 PM    DATE OF DISCHARGE: 3/14/2018    PRIMARY CARE PROVIDER: None     ATTENDING PHYSICIAN: Zainab Reece MD  DISCHARGING PROVIDER: Zainab Reece MD    To contact this individual call [phone] and ask the  to page. If unavailable ask to be transferred the Adult Hospitalist Department. DISCHARGE DIAGNOSES flu and pneumonia    CONSULTATIONS: IP CONSULT TO HOSPITALIST    PENDING TEST RESULTS:   At the time of discharge the following test results are still pending: final Micro    FOLLOW UP APPOINTMENTS:   Follow-up Information     Follow up With Details Comments Contact Info    None   None ([phone]) Patient stated that they have no PCP         YOU TOLD [address] [place] [address]. PLEASE MAKE AN APPOINTMENT FOR WITHIN THE NEXT WEEK. ADDITIONAL CARE RECOMMENDATIONS:     DIET: Regular Diet    ACTIVITY: Activity as tolerated    DISCHARGE MEDICATIONS:   See Medication Reconciliation Form    · It is important that you take the medication exactly as they are prescribed. · Keep your medication in the bottles provided by the pharmacist and keep a list of the medication names, dosages, and times to be taken in your wallet. · Do not take other medications without consulting your doctor. NOTIFY YOUR PHYSICIAN FOR ANY OF THE FOLLOWING:   Fever over 101 degrees for 24 hours. Chest pain, shortness of breath, fever, chills, nausea, vomiting, diarrhea, change in mentation, falling, weakness, bleeding.  Severe pain or pain not relieved by medications. Or, any other signs or symptoms that you may have questions about. DISPOSITION:    Home With:   OT  PT  HH  RN       SNF/Inpatient Rehab/LTAC    Independent/assisted living    Hospice    Other:       Signed:   Marysol Patterson MD  3/14/2018  3:19 PM  Influenza (Flu): Care Instructions  Your Care Instructions    Influenza (flu) is an infection in the lungs and breathing passages. It is caused by the influenza virus. There are different strains, or types, of the flu virus from year to year. Unlike the common cold, the flu comes on suddenly and the symptoms, such as a cough, congestion, fever, chills, fatigue, aches, and pains, are more severe. These symptoms may last up to 10 days. Although the flu can make you feel very sick, it usually doesn't cause serious health problems. Home treatment is usually all you need for flu symptoms. But your doctor may prescribe antiviral medicine to prevent other health problems, such as pneumonia, from developing. Older people and those who have a long-term health condition, such as lung disease, are most at risk for having pneumonia or other health problems. Follow-up care is a key part of your treatment and safety. Be sure to make and go to all appointments, and call your doctor if you are having problems. It's also a good idea to know your test results and keep a list of the medicines you take. How can you care for yourself at home? · Get plenty of rest.  · Drink plenty of fluids, enough so that your urine is light yellow or clear like water. If you have kidney, heart, or liver disease and have to limit fluids, talk with your doctor before you increase the amount of fluids you drink. · Take an over-the-counter pain medicine if needed, such as acetaminophen (Tylenol), ibuprofen (Advil, Motrin), or naproxen (Aleve), to relieve fever, headache, and muscle aches. Read and follow all instructions on the label. No one younger than 20 should take aspirin.  It has been linked to Reye syndrome, a serious illness. · Do not smoke. Smoking can make the flu worse. If you need help quitting, talk to your doctor about stop-smoking programs and medicines. These can increase your chances of quitting for good. · Breathe moist air from a hot shower or from a sink filled with hot water to help clear a stuffy nose. · Before you use cough and cold medicines, check the label. These medicines may not be safe for young children or for people with certain health problems. · If the skin around your nose and lips becomes sore, put some petroleum jelly on the area. · To ease coughing:  ¨ Drink fluids to soothe a scratchy throat. ¨ Suck on cough drops or plain hard candy. ¨ Take an over-the-counter cough medicine that contains dextromethorphan to help you get some sleep. Read and follow all instructions on the label. ¨ Raise your head at night with an extra pillow. This may help you rest if coughing keeps you awake. · Take any prescribed medicine exactly as directed. Call your doctor if you think you are having a problem with your medicine. To avoid spreading the flu  · Wash your hands regularly, and keep your hands away from your face. · Stay home from school, work, and other public places until you are feeling better and your fever has been gone for at least 24 hours. The fever needs to have gone away on its own without the help of medicine. · Ask people living with you to talk to their doctors about preventing the flu. They may get antiviral medicine to keep from getting the flu from you. · To prevent the flu in the future, get a flu vaccine every fall. Encourage people living with you to get the vaccine. · Cover your mouth when you cough or sneeze. ·   When should you call for help? Call 911 anytime you think you may need emergency care. For example, call if:  ? · You have severe trouble breathing.    ?Call your doctor now or seek immediate medical care if:  ? · You have new or worse trouble breathing. ? · You seem to be getting much sicker. ? · You feel very sleepy or confused. ? · You have a new or higher fever. ? · You get a new rash. ? Watch closely for changes in your health, and be sure to contact your doctor if:  ? · You begin to get better and then get worse. ? · You are not getting better after 1 week. Where can you learn more? Go to http://jeanne-eyad.info/. Enter M104 in the search box to learn more about \"Influenza (Flu): Care Instructions. \"  Current as of: May 12, 2017  Content Version: 11.4  © 0089-0142 [employer]. Care instructions adapted under license by [employer] (which disclaims liability or warranty for this information). If you have questions about a medical condition or this instruction, always ask your healthcare professional. [address] any warranty or liability for your use of this information.

## 2018-03-14 NOTE — PROGRESS NOTES
Dr. Barbara Jaquez notifed of patients temp taken at [phone] and [phone] prior to discharge. Per Dr. Barbara Jaquez, pt still can be discharged with low grade temp.      03/14/18 1712   Vital Signs   Temp 99.5 °F (37.5 °C)   Temp Source Oral   Patient Observation   Repositioned Head of bed elevated (degrees)   Patient Turned Turns self   Activity Family/Visitors present; In chair   Mode of Transportation Wheelchair

## 2018-03-14 NOTE — PROGRESS NOTES
Bedside and Verbal shift change report given to Hafsa Guzman RN (oncoming nurse) by Peyton Whiteside RN (offgoing nurse). Report included the following information SBAR, Kardex, MAR, Accordion and Recent Results.

## 2018-03-14 NOTE — PROGRESS NOTES
4:12 PM  CM notified that patient has orders for discharge. CM received a consult to set up follow-up appointment. CM met with patient at bedside and reviewed and discussed disposition needs. Patient is open for CM Specialist to schedule a follow-up appointment with a PCP office close to his place of residence. CM contacted CM Specialist, Ayan Horne [phone]. CM Specialist to schedule an appointment and place on AVS.  Mode of transport to be provided by patient's friend at discharge. Patient to contact his friend.     MEGAN Acuña/WICHO

## 2018-03-14 NOTE — CDMP QUERY
The diagnosis of Sepsis has been documented for your patient. Based on the noted clinical findings, the diagnosis of Sepsis may be challenged by an external reviewer.       Please document if Sepsis has been ruled out     Current Documentation:   Fever (pt has pneumonia), mild tachycardia, WBC 5.2 (WNL)      Criteria that is needed to support diagnosis:    Temp < 96.8°F/36°C or > 100.4°F/38°C    Resps > 20 breaths/min    Pulse > 90 bpm    PaCO2 <32 mmHg    WBC > 12K or < 4K or Bands > 10%         Thank you,         [place], [facility]

## 2018-03-14 NOTE — PROGRESS NOTES
Dr Cheryl Zhao called in and share her plan to discharge pt later this pm, if VS are stable.  Dr will check on the pt later this pm.

## 2018-03-14 NOTE — PROGRESS NOTES
Spiritual Care Assessment/Progress Note  [facility]      NAME: Kyara Zamora      MRN: [id number]  AGE: 33 y.o. SEX: male  Confucianism Affiliation: Sikh   Language: Pashto     3/14/2018     Total Time (in minutes): 11     Spiritual Assessment begun in [facility] through conversation with:         [x]Patient        [] Family    [] Friend(s)        Reason for Consult: Advance medical directive consult     Spiritual beliefs: (Please include comment if needed)     [] Involved in a gwendolyn tradition/spiritual practice:     [] Supported by a gwendolyn community:      [] Claims no spiritual orientation:      [] Seeking spiritual identity:           [] Adheres to an individual form of spirituality:      [x] Not able to assess:                     Identified resources for coping:      [] Prayer                  [] Devotional reading               [] Music                  [] Guided Imagery     [] Family/friends                 [] Pet visits     [] Other:        Interventions offered during this visit: (See comments for more details)    Patient Interventions: Advance medical directive consult           Plan of Care:     [] Discuss Spiritual/Cultural needs    [x] Support AMD and/or advance care planning process      [] Support grieving process   [] Coordinate Rites/Rituals    [] Coordination with community clergy   [] No spiritual needs identified at this time   [] Detailed Plan of Care below (See Comments)  [] Make referral to Music Therapy  [] Make referral to Pet Therapy     [] Make referral to Addiction services  [] Make referral to [facility]  [] Make referral to Spiritual Care Partner  [] No future visits requested             Comments:     Request by IB to assist with Advance Medical Directive (AMD). Consulted with patients nurse.  After conversation  did not complete AMD at this time, seems to be inappropriate at this time and pt was sleeping.      Chaplain Christie Roblero, M.Div, [facility] [phone])

## 2018-03-14 NOTE — PROGRESS NOTES
Patient listed as not having a primary care physician. Hospital follow-up PCP transitional care appointment has been scheduled with JONNA Lazaro for Tuesday, 3/20/18 at 2:00 p.m. Patient needs to arrive 15 minutes early with picture ID, insurance card and a listing of all medications. Pending patient discharge.   Bridger Vazquez, Care Management Specialist.

## 2018-03-14 NOTE — DISCHARGE SUMMARY
Discharge Summary       PATIENT ID: Tina Arvizu  MRN: [id number]   YOB: 1985    DATE OF ADMISSION: 3/12/2018  8:44 PM    DATE OF DISCHARGE: 3/14/2018  PRIMARY CARE PROVIDER: None     DISCHARGING PROVIDER: Allie Flood MD    To contact this individual call [phone] and ask the  to page. If unavailable ask to be transferred the Adult Hospitalist Department. CONSULTATIONS: IP CONSULT TO HOSPITALIST    ADMITTING DIAGNOSES:   This is a 67-year-old man without any significant past medical history, was in his usual state of health until about 2 days ago when the patient developed a cough and sore throat.  The symptoms are progressive and getting worse. The cough is productive of a yellowish sputum associated with chest pain and diaphoresis.    The chest pain is located at the right side of the chest, described as pleuritic, 7/10 in severity, no radiation.    No known aggravating or relieving factors associated with diaphoresis.  The patient denies sick contact.  No night sweat, no weight loss.  The patient denies history of recent travel. Lalitha Christiansen was brought to the emergency room because of worsening symptoms.  When the patient arrived by the emergency room, the initial temperature was 99.3.  This went up to 100.3, heart rate was 115.  Chest x-ray shows evidence of pneumonia.  He was also found to be influenza A antigen positive.  The patient was started on antibiotics and Tamiflu.  He was referred to the hospitalist service for evaluation for admission.  The patient has no history of asthma.     DISCHARGE DIAGNOSES / PLAN:      Influenza with superimposed PNA: Patient presented with fever, hypotension, and tachycardia.    -positive for influenza  -patient with mild lingular airspace disease on imaging  -he was started on Vancomycin and Zosyn in the ED; narrowed to Levaquin to complete course  -patient without high fever for 24 hours prior to discharge (one mild fever noted -below 101- but rest of vitals stable)  -patient to follow-up with PCP within one week of discharge (plans to see the same family doctor as his children)  -continue supportive care as an outpatient (patient counseled)     Influenza A infection: Continue with Tamiflu and supportive care.  -patient counseled on staying hydrated, hand hygiene, wearing mask around his kids     Hypokalemia, resolved.     Current smoker.  The patient advised to quit smoking completely.    -stating he will stop  -RX for nicotine patch given     All counseling was done with  phone        PENDING TEST RESULTS:   At the time of discharge the following test results are still pending: Final Micro    FOLLOW UP APPOINTMENTS:    Follow-up Information     Follow up With Details Comments RACHEL Casanova, NP On 3/20/2018 Hospital follow up new PCP appt on Tuesday, 3/20/18 @ 2:00 p.m. Patient needs to arrive 15 minutes early with photo ID, insurance card and a listing of all medications. [address]  [phone]         Above appointment or with family doctor, as above. ADDITIONAL CARE RECOMMENDATIONS:     DIET: Regular Diet    ACTIVITY: Activity as tolerated     DISCHARGE MEDICATIONS:  Current Discharge Medication List      START taking these medications    Details   levoFLOXacin (LEVAQUIN) 500 mg tablet Take 1 Tab by mouth every twenty-four (24) hours. Qty: 4 Tab, Refills: 0      nicotine (NICODERM CQ) 21 mg/24 hr 1 Patch by TransDERmal route every twenty-four (24) hours for 30 days. Qty: 30 Patch, Refills: 0      oseltamivir (TAMIFLU) 75 mg capsule Take 1 Cap by mouth two (2) times a day for 3 days. Qty: 6 Cap, Refills: 0      azithromycin (ZITHROMAX) 250 mg tablet Take 1 Tab by mouth daily for 4 days. Qty: 4 Tab, Refills: 0      ibuprofen (MOTRIN) 600 mg tablet Take 1 Tab by mouth every six (6) hours as needed for Pain.   Qty: 20 Tab, Refills: 0      !! acetaminophen (TYLENOL) 325 mg tablet Take 2 Tabs by mouth every four (4) hours as needed for Pain. Qty: 20 Tab, Refills: 0       !! - Potential duplicate medications found. Please discuss with provider. CONTINUE these medications which have NOT CHANGED    Details   !! ACETAMINOPHEN (TYLENOL PO) Take 1 Tab by mouth every four (4) hours as needed for Pain. !! - Potential duplicate medications found. Please discuss with provider. NOTIFY YOUR PHYSICIAN FOR ANY OF THE FOLLOWING:   Fever over 101 degrees for 24 hours. Chest pain, shortness of breath, fever, chills, nausea, vomiting, diarrhea, change in mentation, falling, weakness, bleeding. Severe pain or pain not relieved by medications. Or, any other signs or symptoms that you may have questions about.     DISPOSITION:    Home With:   OT  PT  HH  RN       Long term SNF/Inpatient Rehab    Independent/assisted living    Hospice    Other:       PATIENT CONDITION AT DISCHARGE:     Functional status    Poor     Deconditioned     Independent      Cognition     Lucid     Forgetful     Dementia      Catheters/lines (plus indication)    Donnelly     PICC     PEG     None      Code status     Full code     DNR      PHYSICAL EXAMINATION AT DISCHARGE:    Constitutional:  No acute distress, cooperative   ENT:  Neck supple   Resp:  CTAB   CV:  Regular rhythm, increased rate, no murmurs    GI:  Soft, non distended, non tender    Musculoskeletal:  No edema, warm    Neurologic:  Moves all extremities       CHRONIC MEDICAL DIAGNOSES:  Problem List as of 3/14/2018  Date Reviewed: 3/13/2018          Codes Class Noted - Resolved    * (Principal)RESOLVED: [facility]eps[facility] ([facility] ICD-10-CM: A41.9  ICD-9-CM: 038.9, 995.91  3/13/2018 - 3/14/2018              Greater than 30 minutes were spent with the patient on counseling and coordination of care    Signed:   Celia Israel MD  3/14/2018  4:41 PM

## 2018-03-14 NOTE — PROGRESS NOTES
Bedside and Verbal shift change report given to Faina Hwang (oncoming nurse) by Lillian Merchant RN (offgoing nurse). Report included the following information SBAR, Kardex, Intake/Output, MAR and Recent Results.

## 2018-03-18 LAB
BACTERIA SPEC CULT: NORMAL
SERVICE CMNT-IMP: NORMAL

## 2018-03-26 ENCOUNTER — OFFICE VISIT (OUTPATIENT)
Dept: INTERNAL MEDICINE CLINIC | Age: 33
End: 2018-03-26

## 2018-03-26 VITALS
HEIGHT: 69 IN | RESPIRATION RATE: 16 BRPM | BODY MASS INDEX: 26.66 KG/M2 | WEIGHT: 180 LBS | DIASTOLIC BLOOD PRESSURE: 80 MMHG | HEART RATE: 88 BPM | SYSTOLIC BLOOD PRESSURE: 110 MMHG | OXYGEN SATURATION: 98 % | TEMPERATURE: 98.4 F

## 2018-03-26 DIAGNOSIS — N28.9 RENAL INSUFFICIENCY: ICD-10-CM

## 2018-03-26 DIAGNOSIS — R73.01 FASTING HYPERGLYCEMIA: ICD-10-CM

## 2018-03-26 DIAGNOSIS — J18.9 PNEUMONIA DUE TO INFECTIOUS ORGANISM, UNSPECIFIED LATERALITY, UNSPECIFIED PART OF LUNG: Primary | ICD-10-CM

## 2018-03-26 NOTE — PROGRESS NOTES
Chief Complaint   Patient presents with   [address] Patient     Reviewed record in preparation for visit and have obtained necessary documentation. Identified pt with two pt identifiers(name and ). Health Maintenance Due   Topic    Pneumococcal 19-64 Medium Risk (1 of 1 - PPSV23)    DTaP/Tdap/Td series (1 - Tdap)         Chief Complaint   Patient presents with   [address] Patient        Wt Readings from Last 3 Encounters:   18 180 lb (81.6 kg)   18 179 lb (81.2 kg)     Temp Readings from Last 3 Encounters:   18 98.4 °F (36.9 °C) (Oral)   18 99.5 °F (37.5 °C)     BP Readings from Last 3 Encounters:   18 110/80   18 110/69     Pulse Readings from Last 3 Encounters:   18 88   18 94           Learning Assessment:  :     No flowsheet data found. Depression Screening:  :     No flowsheet data found. Fall Risk Assessment:  :     No flowsheet data found. Abuse Screening:  :     No flowsheet data found. Coordination of Care Questionnaire:  :     1) Have you been to an emergency room, urgent care clinic since your last visit? no   Hospitalized since your last visit? no             2) Have you seen or consulted any other health care providers outside of [address] [place] [address] since your last visit? no  (Include any pap smears or colon screenings in this section.)    3) Do you have an Advance Directive on file? no    4) Are you interested in receiving information on Advance Directives? NO      Patient is accompanied by friend I have received verbal consent from [facility] to discuss any/all medical information while they are present in the room. Reviewed record  In preparation for visit and have obtained necessary documentation.

## 2018-03-26 NOTE — PROGRESS NOTES
Subjective:     Chief Complaint   Patient presents with   [address] Patient     He  is a 33y.o. year old male who presents for evaluation. New patient to me and to practice. My Pashto is poor and his English is challenged so use of a telephone interpretor used. Was recently hospitalized with pneumonia. Initially received IV antibiotics with Vancomycin and Zosyn but therapy was ultimately narrowed to Levaquin. Had been an smoker but compelled to quit with onset of this illness. Is on nicotine patch. Is otherwise healthy individual.  FH is unremarkable. Historical Data    No past medical history on file. No past surgical history on file. Outpatient Encounter Prescriptions as of 3/26/2018   Medication Sig Dispense Refill    nicotine (NICODERM CQ) 21 mg/24 hr 1 Patch by TransDERmal route every twenty-four (24) hours for 30 days. 30 Patch 0    [DISCONTINUED] levoFLOXacin (LEVAQUIN) 500 mg tablet Take 1 Tab by mouth every twenty-four (24) hours. 4 Tab 0    [DISCONTINUED] ACETAMINOPHEN (TYLENOL PO) Take 1 Tab by mouth every four (4) hours as needed for Pain.  [DISCONTINUED] ibuprofen (MOTRIN) 600 mg tablet Take 1 Tab by mouth every six (6) hours as needed for Pain. 20 Tab 0    [DISCONTINUED] acetaminophen (TYLENOL) 325 mg tablet Take 2 Tabs by mouth every four (4) hours as needed for Pain. 20 Tab 0     No facility-administered encounter medications on file as of 3/26/2018. No Known Allergies     Social History     Social History    Marital status: UNKNOWN     Spouse name: N/A    Number of children: N/A    Years of education: N/A     Occupational History    Not on file.      Social History Main Topics    Smoking status: Current Every Day Smoker     Packs/day: 1.00    Smokeless tobacco: Never Used    Alcohol use Not on file    Drug use: Not on file    Sexual activity: Not on file     Other Topics Concern    Not on file     Social History Narrative        Review of Systems  A comprehensive review of systems was negative except for that written in the HPI. Objective:     Vitals:    03/26/18 1138   BP: 110/80   Pulse: 88   Resp: 16   Temp: 98.4 °F (36.9 °C)   TempSrc: Oral   SpO2: 98%   Weight: 180 lb (81.6 kg)   Height: 5' 8.9\" (1.75 m)     Pleasant Japanese male in no acute distress. Ears: Canals are clear. Throat: No erythema. Lungs: Clear to auscultation. Cardiac: RRR without murmurs, gallops or rubs. ASSESSMENT / PLAN:   1. Pneumonia due to infectious organism, unspecified laterality, unspecified part of lung  · Resolved. · Has stopped smoking. 2. Renal insufficiency  · Follow up hospital abnormalities. - METABOLIC PANEL, BASIC    3. Fasting hyperglycemia  · Follow up hospital abnormalities. - METABOLIC PANEL, BASIC  - HEMOGLOBIN A1C WITH EAG             Follow-up Disposition:  Return if symptoms worsen or fail to improve. Advised him to call back or return to office if symptoms worsen/change/persist.  Discussed expected course/resolution/complications of diagnosis in detail with patient. Medication risks/benefits/costs/interactions/alternatives discussed with patient. He was given an after visit summary which includes diagnoses, current medications, & vitals. He expressed understanding with the diagnosis and plan.

## 2018-03-27 LAB
BUN SERPL-MCNC: 13 MG/DL (ref 6–20)
BUN/CREAT SERPL: 20 (ref 9–20)
CALCIUM SERPL-MCNC: 9 MG/DL (ref 8.7–10.2)
CHLORIDE SERPL-SCNC: 100 MMOL/L (ref 96–106)
CO2 SERPL-SCNC: 25 MMOL/L (ref 18–29)
CREAT SERPL-MCNC: 0.65 MG/DL (ref 0.76–1.27)
EST. AVERAGE GLUCOSE BLD GHB EST-MCNC: 111 MG/DL
GFR SERPLBLD CREATININE-BSD FMLA CKD-EPI: 130 ML/MIN/1.73
GFR SERPLBLD CREATININE-BSD FMLA CKD-EPI: 150 ML/MIN/1.73
GLUCOSE SERPL-MCNC: 92 MG/DL (ref 65–99)
HBA1C MFR BLD: 5.5 % (ref 4.8–5.6)
POTASSIUM SERPL-SCNC: 4.2 MMOL/L (ref 3.5–5.2)
SODIUM SERPL-SCNC: 142 MMOL/L (ref 134–144)

## 2018-10-07 ENCOUNTER — APPOINTMENT (OUTPATIENT)
Dept: GENERAL RADIOLOGY | Age: 33
End: 2018-10-07
Attending: EMERGENCY MEDICINE
Payer: COMMERCIAL

## 2018-10-07 ENCOUNTER — APPOINTMENT (OUTPATIENT)
Dept: CT IMAGING | Age: 33
End: 2018-10-07
Attending: EMERGENCY MEDICINE
Payer: COMMERCIAL

## 2018-10-07 ENCOUNTER — HOSPITAL ENCOUNTER (EMERGENCY)
Age: 33
Discharge: HOME | End: 2018-10-07
Attending: EMERGENCY MEDICINE
Payer: COMMERCIAL

## 2018-10-07 VITALS
OXYGEN SATURATION: 100 % | DIASTOLIC BLOOD PRESSURE: 74 MMHG | TEMPERATURE: 98.8 F | WEIGHT: 179.9 LBS | SYSTOLIC BLOOD PRESSURE: 114 MMHG | HEIGHT: 72 IN | HEART RATE: 99 BPM | RESPIRATION RATE: 18 BRPM | BODY MASS INDEX: 24.37 KG/M2

## 2018-10-07 DIAGNOSIS — M54.9 ACUTE BILATERAL BACK PAIN, UNSPECIFIED BACK LOCATION: Primary | ICD-10-CM

## 2018-10-07 LAB
ANION GAP BLD CALC-SCNC: 16 MMOL/L (ref 10–20)
APPEARANCE UR: CLEAR
BACTERIA URNS QL MICRO: NEGATIVE /HPF
BASOPHILS # BLD: 0.1 K/UL (ref 0–0.1)
BASOPHILS NFR BLD: 1 % (ref 0–1)
BILIRUB UR QL: NEGATIVE
BUN BLD-MCNC: 9 MG/DL (ref 9–20)
CA-I BLD-MCNC: 1.14 MMOL/L (ref 1.12–1.32)
CHLORIDE BLD-SCNC: 101 MMOL/L (ref 98–107)
CO2 BLD-SCNC: 27 MMOL/L (ref 21–32)
COLOR UR: NORMAL
COMMENT, HOLDF: NORMAL
CREAT BLD-MCNC: 0.6 MG/DL (ref 0.6–1.3)
D DIMER PPP FEU-MCNC: 0.46 MG/L FEU (ref 0–0.65)
DIFFERENTIAL METHOD BLD: NORMAL
EOSINOPHIL # BLD: 0.2 K/UL (ref 0–0.4)
EOSINOPHIL NFR BLD: 2 % (ref 0–7)
EPITH CASTS URNS QL MICRO: NORMAL /LPF
ERYTHROCYTE [DISTWIDTH] IN BLOOD BY AUTOMATED COUNT: 12.9 % (ref 11.5–14.5)
GLUCOSE BLD-MCNC: 84 MG/DL (ref 65–100)
GLUCOSE UR STRIP.AUTO-MCNC: NEGATIVE MG/DL
HCT VFR BLD AUTO: 44.9 % (ref 36.6–50.3)
HCT VFR BLD CALC: 45 % (ref 36.6–50.3)
HGB BLD-MCNC: 15.4 G/DL (ref 12.1–17)
HGB UR QL STRIP: NEGATIVE
HYALINE CASTS URNS QL MICRO: NORMAL /LPF (ref 0–5)
IMM GRANULOCYTES # BLD: 0 K/UL (ref 0–0.04)
IMM GRANULOCYTES NFR BLD AUTO: 0 % (ref 0–0.5)
KETONES UR QL STRIP.AUTO: NEGATIVE MG/DL
LEUKOCYTE ESTERASE UR QL STRIP.AUTO: NEGATIVE
LYMPHOCYTES # BLD: 2.2 K/UL (ref 0.8–3.5)
LYMPHOCYTES NFR BLD: 30 % (ref 12–49)
MCH RBC QN AUTO: 28.2 PG (ref 26–34)
MCHC RBC AUTO-ENTMCNC: 34.3 G/DL (ref 30–36.5)
MCV RBC AUTO: 82.1 FL (ref 80–99)
MONOCYTES # BLD: 0.8 K/UL (ref 0–1)
MONOCYTES NFR BLD: 11 % (ref 5–13)
NEUTS SEG # BLD: 4.1 K/UL (ref 1.8–8)
NEUTS SEG NFR BLD: 56 % (ref 32–75)
NITRITE UR QL STRIP.AUTO: NEGATIVE
NRBC # BLD: 0 K/UL (ref 0–0.01)
NRBC BLD-RTO: 0 PER 100 WBC
PH UR STRIP: 6 [PH] (ref 5–8)
PLATELET # BLD AUTO: 228 K/UL (ref 150–400)
PMV BLD AUTO: 10 FL (ref 8.9–12.9)
POTASSIUM BLD-SCNC: 3.7 MMOL/L (ref 3.5–5.1)
PROT UR STRIP-MCNC: NEGATIVE MG/DL
RBC # BLD AUTO: 5.47 M/UL (ref 4.1–5.7)
RBC #/AREA URNS HPF: NORMAL /HPF (ref 0–5)
SAMPLES BEING HELD,HOLD: NORMAL
SERVICE CMNT-IMP: NORMAL
SODIUM BLD-SCNC: 138 MMOL/L (ref 136–145)
SP GR UR REFRACTOMETRY: 1.01 (ref 1–1.03)
UR CULT HOLD, URHOLD: NORMAL
UROBILINOGEN UR QL STRIP.AUTO: 1 EU/DL (ref 0.2–1)
WBC # BLD AUTO: 7.3 K/UL (ref 4.1–11.1)
WBC URNS QL MICRO: NORMAL /HPF (ref 0–4)

## 2018-10-07 PROCEDURE — 74011250637 HC RX REV CODE- 250/637: Performed by: EMERGENCY MEDICINE

## 2018-10-07 PROCEDURE — 80047 BASIC METABLC PNL IONIZED CA: CPT

## 2018-10-07 PROCEDURE — 74011250636 HC RX REV CODE- 250/636: Performed by: EMERGENCY MEDICINE

## 2018-10-07 PROCEDURE — 36415 COLL VENOUS BLD VENIPUNCTURE: CPT | Performed by: EMERGENCY MEDICINE

## 2018-10-07 PROCEDURE — 99283 EMERGENCY DEPT VISIT LOW MDM: CPT

## 2018-10-07 PROCEDURE — 74176 CT ABD & PELVIS W/O CONTRAST: CPT

## 2018-10-07 PROCEDURE — 85379 FIBRIN DEGRADATION QUANT: CPT | Performed by: EMERGENCY MEDICINE

## 2018-10-07 PROCEDURE — 71046 X-RAY EXAM CHEST 2 VIEWS: CPT

## 2018-10-07 PROCEDURE — 85025 COMPLETE CBC W/AUTO DIFF WBC: CPT | Performed by: EMERGENCY MEDICINE

## 2018-10-07 PROCEDURE — 96374 THER/PROPH/DIAG INJ IV PUSH: CPT

## 2018-10-07 PROCEDURE — 81001 URINALYSIS AUTO W/SCOPE: CPT | Performed by: EMERGENCY MEDICINE

## 2018-10-07 RX ORDER — HYDROCODONE BITARTRATE AND ACETAMINOPHEN 5; 325 MG/1; MG/1
1 TABLET ORAL
Status: COMPLETED | OUTPATIENT
Start: 2018-10-07 | End: 2018-10-07

## 2018-10-07 RX ORDER — NAPROXEN 500 MG/1
500 TABLET ORAL 2 TIMES DAILY WITH MEALS
Qty: 20 TAB | Refills: 0 | Status: SHIPPED | OUTPATIENT
Start: 2018-10-07 | End: 2021-06-18

## 2018-10-07 RX ORDER — KETOROLAC TROMETHAMINE 30 MG/ML
30 INJECTION, SOLUTION INTRAMUSCULAR; INTRAVENOUS
Status: COMPLETED | OUTPATIENT
Start: 2018-10-07 | End: 2018-10-07

## 2018-10-07 RX ORDER — HYDROCODONE BITARTRATE AND ACETAMINOPHEN 5; 325 MG/1; MG/1
1 TABLET ORAL
Qty: 8 TAB | Refills: 0 | Status: SHIPPED | OUTPATIENT
Start: 2018-10-07 | End: 2021-06-18

## 2018-10-07 RX ADMIN — HYDROCODONE BITARTRATE AND ACETAMINOPHEN 1 TABLET: 5; 325 TABLET ORAL at 18:22

## 2018-10-07 RX ADMIN — KETOROLAC TROMETHAMINE 30 MG: 30 INJECTION, SOLUTION INTRAMUSCULAR at 18:22

## 2018-10-07 NOTE — ED PROVIDER NOTES
HPI Comments:  
36-year-old male presents emergency room for evaluation of back pain. The back pain started yesterday. Pain is located in the mid left back pain has moved to the right back today. The pain is worse with twisting, bending over or lying flat area and pain also worse with deep breaths. No fever or chills. No chest pain or shortness of breath. No abdominal pain, nausea or vomiting. No cough, congestion, runny nose, sore throat or URI symptoms. The pain does not radiate into the buttocks or the legs. No noted blood in the urine. No dysuria, frequency or urgency. No numbness or tingling of the extremities, loss of bowel or bladder control or difficulty urinating Patient has not taken anything for pain. No precipitating events. Social hx Smoking No alcohol or border and a Patient is a 29 y.o. male presenting with flank pain. The history is provided by the patient and a friend. A  was used. Flank Pain Pertinent negatives include no chest pain, no fever, no abdominal pain and no dysuria. History reviewed. No pertinent past medical history. History reviewed. No pertinent surgical history. History reviewed. No pertinent family history. Social History Social History  Marital status: UNKNOWN Spouse name: N/A  
 Number of children: N/A  
 Years of education: N/A Occupational History  Not on file. Social History Main Topics  Smoking status: Former Smoker Packs/day: 1.00 Quit date: 3/16/2018  Smokeless tobacco: Current User  Alcohol use No  
 Drug use: No  
 Sexual activity: Not on file Other Topics Concern  Not on file Social History Narrative ALLERGIES: Review of patient's allergies indicates no known allergies. Review of Systems Constitutional: Negative for chills and fever. HENT: Negative for congestion and rhinorrhea. Respiratory: Negative for cough and shortness of breath. Cardiovascular: Negative for chest pain. Gastrointestinal: Negative for abdominal pain, nausea and vomiting. Genitourinary: Negative for difficulty urinating, dysuria, flank pain, hematuria and urgency. Musculoskeletal: Positive for back pain. Negative for neck pain. Skin: Negative for color change and rash. All other systems reviewed and are negative. Vitals:  
 10/07/18 1718 BP: 114/74 Pulse: 99 Resp: 18 Temp: 98.8 °F (37.1 °C) SpO2: 100% Weight: 81.6 kg (179 lb 14.3 oz) Height: 6' (1.829 m) Physical Exam  
Constitutional: He is oriented to person, place, and time. He appears well-developed and well-nourished. No distress. HENT:  
Head: Normocephalic and atraumatic. Right Ear: External ear normal.  
Left Ear: External ear normal.  
Eyes: Conjunctivae and EOM are normal. Pupils are equal, round, and reactive to light. Neck: Normal range of motion. Neck supple. Cardiovascular: Normal rate, regular rhythm and normal heart sounds. Pulmonary/Chest: Effort normal and breath sounds normal. No respiratory distress. He has no wheezes. Abdominal: Soft. Normal appearance and bowel sounds are normal. He exhibits no shifting dullness, no distension, no pulsatile liver, no abdominal bruit, no pulsatile midline mass and no mass. There is no hepatosplenomegaly, splenomegaly or hepatomegaly. There is no tenderness. There is no rigidity, no rebound, no guarding, no CVA tenderness, no tenderness at McBurney's point and negative Leonard's sign. Abdomen exposed for exam. 
Soft, nontender to palpation Musculoskeletal: Normal range of motion. He exhibits no edema or tenderness. Tender to palpation bilateral midback. No midline tendnerness to palpation TLS spine pain Neurological: He is alert and oriented to person, place, and time. He has normal reflexes. No cranial nerve deficit. Coordination normal.  
Skin: Skin is warm and dry. No rash noted. No erythema. Psychiatric: He has a normal mood and affect. His behavior is normal. Judgment and thought content normal.  
Nursing note and vitals reviewed. MDM Number of Diagnoses or Management Options Acute bilateral back pain, unspecified back location:  
Diagnosis management comments: 58-year-old male presenting for back pain. Patient is tender in the mid back bilaterally. Pain with taking deep breaths. Lungs are clear. He is in no distress. No edema ecchymosis noted. Abdomen is soft nontender. He is afebrile. Plan: Chest x-ray, labs, urinalysis, pain control PE, musculoskeletal, pneumothorax, rib fracture, radiculopathy, UTI The patient is in overall good health. The patient denies a history of IV drug abuse, skin infections, or chronic back problems. The patient has back pain without signs of spinal cord compression, cauda equina syndrome, infection, abscess, aneurysm, or other medically serious etiology. The patient is neurologically intact. Given the low risk of these diagnoses further testing and evaluation for these possibilities does not appear to be indicated at this time. The patient has been instructed to return if the symptoms worsen or change in any way. Standard narcotic and sedating medication warnings given Patient's results have been reviewed with them. Patient and/or family have verbally conveyed their understanding and agreement of the patient's signs, symptoms, diagnosis, treatment and prognosis and additionally agree to follow up as recommended or return to the Emergency Room should their condition change prior to follow-up. Discharge instructions have also been provided to the patient with some educational information regarding their diagnosis as well a list of reasons why they would want to return to the ER prior to their follow-up appointment should their condition change. Amount and/or Complexity of Data Reviewed Discuss the patient with other providers: yes (ER attendingHayde) Patient Progress Patient progress: stable ED Course Procedures Pt case including HPI, PE, and all available lab and radiology results has been discussed with attending physician. Opportunity to evaluate patient has been provided to ER attending. Discharge and prescription plan has been agreed upon.

## 2018-10-07 NOTE — DISCHARGE INSTRUCTIONS
Musculoskeletal Pain: Care Instructions  Your Care Instructions    If you see this message, please contact your IT team to request the [employer] [employer] RTF [place] and Farsi documents. Different problems with the bones, muscles, nerves, ligaments, and tendons in the body can cause pain. One or more areas of your body may ache or burn. Or they may feel tired, stiff, or sore. The medical term for this type of pain is musculoskeletal pain. It can have many different causes. Sometimes the pain is caused by an injury such as a strain or sprain. Or you might have pain from using one part of your body in the same way over and over again. This is called overuse. In some cases, the cause of the pain is another health problem such as arthritis or fibromyalgia. The doctor will examine you and ask you questions about your health to help find the cause of your pain. Blood tests or imaging tests like an X-ray may also be helpful. But sometimes doctors can't find a cause of the pain. Treatment depends on your symptoms and the cause of the pain, if known. The doctor has checked you carefully, but problems can develop later. If you notice any problems or new symptoms, get medical treatment right away. Follow-up care is a key part of your treatment and safety. Be sure to make and go to all appointments, and call your doctor if you are having problems. It's also a good idea to know your test results and keep a list of the medicines you take. How can you care for yourself at home? · Rest until you feel better. · Do not do anything that makes the pain worse. Return to exercise gradually if you feel better and your doctor says it's okay. · Be safe with medicines. Read and follow all instructions on the label. ¨ If the doctor gave you a prescription medicine for pain, take it as prescribed. ¨ If you are not taking a prescription pain medicine, ask your doctor if you can take an over-the-counter medicine.   · Put ice or a cold pack on the area for 10 to 20 minutes at a time to ease pain. Put a thin cloth between the ice and your skin. When should you call for help? Call your doctor now or seek immediate medical care if:    · You have new pain, or your pain gets worse.     · You have new symptoms such as a fever, a rash, or chills.    Watch closely for changes in your health, and be sure to contact your doctor if:    · You do not get better as expected. Where can you learn more? Go to http://jeanne-eyad.info/. Enter S907 in the search box to learn more about \"Musculoskeletal Pain: Care Instructions. \"  Current as of: June 4, 2018  Content Version: 11.8  © 9354-1056 [employer]. Care instructions adapted under license by [employer] (which disclaims liability or warranty for this information). If you have questions about a medical condition or this instruction, always ask your healthcare professional. [place] [address] any warranty or liability for your use of this information.

## 2018-10-07 NOTE — Clinical Note
Naproxen:1 pill every 12 hours as needed for pain. Norco:1 pill every 6 hours for pain. Be aware of sedating effects. No alcohol or driving. Return to ER for any fever, chills, nausea, vomiting, blood in urine, inability to urinate.

## 2018-10-08 NOTE — ED NOTES
PA reviewed discharge instructions with the patient. The patient verbalized understanding. Patient ambulated out of the emergency department escorted by male companions. Patient is in no apparent distress.

## 2021-01-28 PROBLEM — U07.1 COVID-19: Status: ACTIVE | Noted: 2021-01-09

## 2021-05-21 ENCOUNTER — VIRTUAL VISIT (OUTPATIENT)
Dept: PRIMARY CARE CLINIC | Age: 36
End: 2021-05-21
Payer: MEDICAID

## 2021-05-21 DIAGNOSIS — R04.0 BLEEDING FROM THE NOSE: Primary | ICD-10-CM

## 2021-05-21 DIAGNOSIS — R53.83 FATIGUE, UNSPECIFIED TYPE: ICD-10-CM

## 2021-05-21 DIAGNOSIS — R73.03 PREDIABETES: ICD-10-CM

## 2021-05-21 DIAGNOSIS — J30.1 SEASONAL ALLERGIC RHINITIS DUE TO POLLEN: ICD-10-CM

## 2021-05-21 PROCEDURE — 99203 OFFICE O/P NEW LOW 30 MIN: CPT | Performed by: INTERNAL MEDICINE

## 2021-05-21 RX ORDER — FLUTICASONE PROPIONATE 50 MCG
SPRAY, SUSPENSION (ML) NASAL
Qty: 1 BOTTLE | Refills: 3 | Status: SHIPPED | OUTPATIENT
Start: 2021-05-21 | End: 2021-06-18 | Stop reason: SDUPTHER

## 2021-05-21 RX ORDER — LORATADINE 10 MG/1
10 TABLET ORAL DAILY
Qty: 30 TABLET | Refills: 3 | Status: SHIPPED | OUTPATIENT
Start: 2021-05-21 | End: 2021-06-18 | Stop reason: SDUPTHER

## 2021-05-21 NOTE — PROGRESS NOTES
Jaylyn Berger is a 30 y.o. male who was seen by synchronous (real-time) audio-video technology on 5/21/2021 for Nasal Congestion        Assessment & Plan:   Diagnoses and all orders for this visit:    1. Bleeding from the nose  -     PROTHROMBIN TIME + INR; Future  -     PTT; Future    2. Seasonal allergic rhinitis due to pollen  -     fluticasone propionate (FLONASE) 50 mcg/actuation nasal spray; One spray each nostril daily  -     loratadine (Claritin) 10 mg tablet; Take 1 Tablet by mouth daily. 3. Fatigue, unspecified type  -     CBC WITH AUTOMATED DIFF; Future  -     METABOLIC PANEL, COMPREHENSIVE; Future  -     TSH 3RD GENERATION; Future  -     T4, FREE; Future    4. Prediabetes  -     HEMOGLOBIN A1C WITH EAG; Future        I spent at least 20 minutes on this visit with this new patient. Subjective:       Prior to Admission medications    Medication Sig Start Date End Date Taking? Authorizing Provider   HYDROcodone-acetaminophen (NORCO) 5-325 mg per tablet Take 1 Tab by mouth every six (6) hours as needed for Pain. Max Daily Amount: 4 Tabs. 10/7/18   Jaime Buckley PA-C   naproxen (NAPROSYN) 500 mg tablet Take 1 Tab by mouth two (2) times daily (with meals). 10/7/18   Jaime Buckley PA-C   History    Pt has bleeding from nose. Happening for years since he came to [facility] in 2017. He bleeds from both nostrils. It is like a small blood clot. Pt does have seasonal allergies. No FH for bleeding problems  . Has 3 children. One cat. Works for [employer] in [address] . Pt also feels tired. Does not sleep well. Pt does not know if he snores. NO FH for DM, CAD. Pt feels depressed. Pt has stress. Pt is from [facility]. Pt has fmaily in [facility]. ROS    Objective:   No flowsheet data found.      [INSTRUCTIONS:  \"[x]\" Indicates a positive item  \"[]\" Indicates a negative item  -- DELETE ALL ITEMS NOT EXAMINED]    Constitutional: [x] Appears well-developed and well-nourished [x] No apparent distress [] Abnormal -     Mental status: [x] Alert and awake  [x] Oriented to person/place/time [x] Able to follow commands    [] Abnormal -     Eyes:   EOM    [x]  Normal    [] Abnormal -   Sclera  [x]  Normal    [] Abnormal -          Discharge [x]  None visible   [] Abnormal -     HENT: [x] Normocephalic, atraumatic  [] Abnormal -   [x] Mouth/Throat: Mucous membranes are moist    External Ears [x] Normal  [] Abnormal -    Neck: [x] No visualized mass [] Abnormal -     Pulmonary/Chest: [x] Respiratory effort normal   [x] No visualized signs of difficulty breathing or respiratory distress        [] Abnormal -      Musculoskeletal:   [x] Normal gait with no signs of ataxia         [x] Normal range of motion of neck        [] Abnormal -     Neurological:        [x] No Facial Asymmetry (Cranial nerve 7 motor function) (limited exam due to video visit)          [x] No gaze palsy        [] Abnormal -          Skin:        [x] No significant exanthematous lesions or discoloration noted on facial skin         [] Abnormal -            Psychiatric:       [x] Normal Affect [] Abnormal -        [x] No Hallucinations    Other pertinent observable physical exam findings:-        We discussed the expected course, resolution and complications of the diagnosis(es) in detail. Medication risks, benefits, costs, interactions, and alternatives were discussed as indicated. I advised him to contact the office if his condition worsens, changes or fails to improve as anticipated. He expressed understanding with the diagnosis(es) and plan. Jevon Jackson, was evaluated through a synchronous (real-time) audio-video encounter. The patient (or guardian if applicable) is aware that this is a billable service. Verbal consent to proceed has been obtained within the past 12 months.  The visit was conducted pursuant to the emergency declaration under the [facility], 1135 waiver authority and the Coronavirus Preparedness and Response Supplemental Appropriations Act. Patient identification was verified, and a caregiver was present when appropriate. The patient was located in a state where the provider was credentialed to provide care.       Dl Davis MD

## 2021-05-22 LAB
ALBUMIN SERPL-MCNC: 4 G/DL (ref 3.5–5)
ALBUMIN/GLOB SERPL: 1 {RATIO} (ref 1.1–2.2)
ALP SERPL-CCNC: 98 U/L (ref 45–117)
ALT SERPL-CCNC: 59 U/L (ref 12–78)
ANION GAP SERPL CALC-SCNC: 7 MMOL/L (ref 5–15)
APTT PPP: 24.7 SEC (ref 22.1–31)
AST SERPL-CCNC: 26 U/L (ref 15–37)
BASOPHILS # BLD: 0.1 K/UL (ref 0–0.1)
BASOPHILS NFR BLD: 1 % (ref 0–1)
BILIRUB SERPL-MCNC: 0.4 MG/DL (ref 0.2–1)
BUN SERPL-MCNC: 18 MG/DL (ref 6–20)
BUN/CREAT SERPL: 21 (ref 12–20)
CALCIUM SERPL-MCNC: 9 MG/DL (ref 8.5–10.1)
CHLORIDE SERPL-SCNC: 101 MMOL/L (ref 97–108)
CO2 SERPL-SCNC: 29 MMOL/L (ref 21–32)
CREAT SERPL-MCNC: 0.84 MG/DL (ref 0.7–1.3)
DIFFERENTIAL METHOD BLD: ABNORMAL
EOSINOPHIL # BLD: 0.2 K/UL (ref 0–0.4)
EOSINOPHIL NFR BLD: 4 % (ref 0–7)
ERYTHROCYTE [DISTWIDTH] IN BLOOD BY AUTOMATED COUNT: 13.3 % (ref 11.5–14.5)
EST. AVERAGE GLUCOSE BLD GHB EST-MCNC: 114 MG/DL
GLOBULIN SER CALC-MCNC: 3.9 G/DL (ref 2–4)
GLUCOSE SERPL-MCNC: 128 MG/DL (ref 65–100)
HBA1C MFR BLD: 5.6 % (ref 4–5.6)
HCT VFR BLD AUTO: 44.2 % (ref 36.6–50.3)
HGB BLD-MCNC: 14.9 G/DL (ref 12.1–17)
IMM GRANULOCYTES # BLD AUTO: 0.1 K/UL (ref 0–0.04)
IMM GRANULOCYTES NFR BLD AUTO: 1 % (ref 0–0.5)
INR PPP: 1 (ref 0.9–1.1)
LYMPHOCYTES # BLD: 2.5 K/UL (ref 0.8–3.5)
LYMPHOCYTES NFR BLD: 37 % (ref 12–49)
MCH RBC QN AUTO: 27.2 PG (ref 26–34)
MCHC RBC AUTO-ENTMCNC: 33.7 G/DL (ref 30–36.5)
MCV RBC AUTO: 80.7 FL (ref 80–99)
MONOCYTES # BLD: 0.8 K/UL (ref 0–1)
MONOCYTES NFR BLD: 12 % (ref 5–13)
NEUTS SEG # BLD: 3.2 K/UL (ref 1.8–8)
NEUTS SEG NFR BLD: 45 % (ref 32–75)
NRBC # BLD: 0 K/UL (ref 0–0.01)
NRBC BLD-RTO: 0 PER 100 WBC
PLATELET # BLD AUTO: 295 K/UL (ref 150–400)
PMV BLD AUTO: 10.3 FL (ref 8.9–12.9)
POTASSIUM SERPL-SCNC: 4.2 MMOL/L (ref 3.5–5.1)
PROT SERPL-MCNC: 7.9 G/DL (ref 6.4–8.2)
PROTHROMBIN TIME: 10.9 SEC (ref 9–11.1)
RBC # BLD AUTO: 5.48 M/UL (ref 4.1–5.7)
SODIUM SERPL-SCNC: 137 MMOL/L (ref 136–145)
T4 FREE SERPL-MCNC: 1 NG/DL (ref 0.8–1.5)
THERAPEUTIC RANGE,PTTT: NORMAL SECS (ref 58–77)
TSH SERPL DL<=0.05 MIU/L-ACNC: 2.59 UIU/ML (ref 0.36–3.74)
WBC # BLD AUTO: 6.9 K/UL (ref 4.1–11.1)

## 2021-05-24 NOTE — PROGRESS NOTES
Labs are normal. Will ask pt to follow up in 2-3 weeks for nose  bleeds and to check blood pressure.

## 2021-06-18 ENCOUNTER — OFFICE VISIT (OUTPATIENT)
Dept: PRIMARY CARE CLINIC | Age: 36
End: 2021-06-18
Payer: MEDICAID

## 2021-06-18 VITALS
WEIGHT: 213 LBS | HEART RATE: 75 BPM | HEIGHT: 68 IN | BODY MASS INDEX: 32.28 KG/M2 | RESPIRATION RATE: 18 BRPM | DIASTOLIC BLOOD PRESSURE: 70 MMHG | TEMPERATURE: 97.5 F | OXYGEN SATURATION: 98 % | SYSTOLIC BLOOD PRESSURE: 101 MMHG

## 2021-06-18 DIAGNOSIS — R53.83 FATIGUE, UNSPECIFIED TYPE: ICD-10-CM

## 2021-06-18 DIAGNOSIS — J30.1 SEASONAL ALLERGIC RHINITIS DUE TO POLLEN: Primary | ICD-10-CM

## 2021-06-18 DIAGNOSIS — R73.09 ELEVATED RANDOM BLOOD GLUCOSE LEVEL: ICD-10-CM

## 2021-06-18 DIAGNOSIS — K29.50 CHRONIC GASTRITIS WITHOUT BLEEDING, UNSPECIFIED GASTRITIS TYPE: ICD-10-CM

## 2021-06-18 DIAGNOSIS — R63.5 WEIGHT GAIN: ICD-10-CM

## 2021-06-18 PROCEDURE — 99214 OFFICE O/P EST MOD 30 MIN: CPT | Performed by: INTERNAL MEDICINE

## 2021-06-18 RX ORDER — LORATADINE 10 MG/1
10 TABLET ORAL DAILY
Qty: 90 TABLET | Refills: 1 | Status: SHIPPED | OUTPATIENT
Start: 2021-06-18 | End: 2021-11-24 | Stop reason: SDUPTHER

## 2021-06-18 RX ORDER — FLUTICASONE PROPIONATE 50 MCG
SPRAY, SUSPENSION (ML) NASAL
Qty: 1 BOTTLE | Refills: 3 | Status: SHIPPED | OUTPATIENT
Start: 2021-06-18 | End: 2021-11-24 | Stop reason: SDUPTHER

## 2021-06-18 RX ORDER — OMEPRAZOLE 40 MG/1
40 CAPSULE, DELAYED RELEASE ORAL DAILY
Qty: 90 CAPSULE | Refills: 1 | Status: SHIPPED | OUTPATIENT
Start: 2021-06-18 | End: 2021-11-24 | Stop reason: SDUPTHER

## 2021-06-18 NOTE — PROGRESS NOTES
Chief Complaint   Patient presents with    Complete Physical        Visit Vitals  /70 (BP 1 Location: Left upper arm, BP Patient Position: Sitting)   Pulse 75   Temp 97.5 °F (36.4 °C) (Temporal)   Resp 18   Ht 5' 8\" (1.727 m)   Wt 213 lb (96.6 kg)   SpO2 98%   BMI 32.39 kg/m²        1. Have you been to the ER, urgent care clinic since your last visit? Hospitalized since your last visit? No    2. Have you seen or consulted any other health care providers outside of the [address] [place] [address] since your last visit? Include any pap smears or colon screening.  No

## 2021-06-20 NOTE — PROGRESS NOTES
Brain Camejo is a 30 y.o.  male and presents with     Chief Complaint   Patient presents with    Allergies    GERD    Weight Gain    Fatigue     Patient is accompanied by [place]ian   Patient reported that after using Claritin and Flonase the nosebleeds have improved. He wants to go over the lab results  Blood sugars were slightly elevated. Patient denies any family history for diabetes. Patient has not had mild weight gain and also complains of fatigue. He has symptoms of acid reflux. He does like spicy and greasy food. Past Medical History:   Diagnosis Date    COVID-19 01/09/2021    still PCR + 1/18/21     History reviewed. No pertinent surgical history. Current Outpatient Medications   Medication Sig    omeprazole (PRILOSEC) 40 mg capsule Take 1 Capsule by mouth daily.  loratadine (Claritin) 10 mg tablet Take 1 Tablet by mouth daily.  fluticasone propionate (FLONASE) 50 mcg/actuation nasal spray One spray each nostril daily     No current facility-administered medications for this visit. Health Maintenance   Topic Date Due    Hepatitis C Screening  Never done    COVID-19 Vaccine (1) Never done    DTaP/Tdap/Td series (1 - Tdap) Never done    Flu Vaccine (Season Ended) 09/01/2021    Pneumococcal 0-64 years  Aged [employer] History   Administered Date(s) Administered    Influenza Vaccine [employer]) PF (>6 Mo Flulaval, Fluarix, and >3 Yrs [address] [place] [address], Fluzone [address]) 03/14/2018     No LMP for male patient. Allergies and Intolerances:   No Known Allergies    Family History:   History reviewed. No pertinent family history. Social History:   He  reports that he quit smoking about 3 years ago. He started smoking about 14 years ago. He smoked 1.00 pack per day. He has never used smokeless tobacco.  He  reports no history of alcohol use.             Review of Systems:   General: negative for - chills, fatigue, fever, weight change  Psych: negative for - anxiety, depression, irritability or mood swings  ENT: negative for - headaches, hearing change, nasal congestion, oral lesions, sneezing or sore throat  Heme/ Lymph: negative for - bleeding problems, bruising, pallor or swollen lymph nodes  Endo: negative for - hot flashes, polydipsia/polyuria or temperature intolerance  Resp: negative for - cough, shortness of breath or wheezing  CV: negative for - chest pain, edema or palpitations  GI: negative for - abdominal pain, change in bowel habits, constipation, diarrhea or nausea/vomiting  : negative for - dysuria, hematuria, incontinence, pelvic pain or vulvar/vaginal symptoms  MSK: negative for - joint pain, joint swelling or muscle pain  Neuro: negative for - confusion, headaches, seizures or weakness  Derm: negative for - dry skin, hair changes, rash or skin lesion changes          Physical:   Vitals:   Vitals:    06/18/21 1017   BP: 101/70   Pulse: 75   Resp: 18   Temp: 97.5 °F (36.4 °C)   TempSrc: Temporal   SpO2: 98%   Weight: 213 lb (96.6 kg)   Height: 5' 8\" (1.727 m)           Exam:   HEENT- atraumatic,normocephalic, awake, oriented, well nourished, mild enlarged turbinates in both nostrils, evidence of slight erosion of the mucosa over both the left turbinates. Neck - supple,no enlarged lymph nodes, no JVD, no thyromegaly  Chest- CTA, no rhonchi, no crackles  Heart- rrr, no murmurs / gallop/rub  Abdomen- soft,BS+,NT, no hepatosplenomegaly, mild epigastric tenderness  Ext - no c/c/edema   Neuro- no focal deficits. Power 5/5 all extremities  Skin - warm,dry, no obvious rashes.           Review of Data:   LABS:   Lab Results   Component Value Date/Time    WBC 6.9 05/21/2021 09:48 AM    HGB 14.9 05/21/2021 09:48 AM    HCT 44.2 05/21/2021 09:48 AM    PLATELET 813 34/50/7185 09:48 AM     Lab Results   Component Value Date/Time    Sodium 137 05/21/2021 09:48 AM    Potassium 4.2 05/21/2021 09:48 AM    Chloride 101 05/21/2021 09:48 AM    CO2 29 05/21/2021 09:48 AM    Glucose 128 (H) 05/21/2021 09:48 AM    BUN 18 05/21/2021 09:48 AM    Creatinine 0.84 05/21/2021 09:48 AM     Lab Results   Component Value Date/Time    Cholesterol, total 99 03/13/2018 04:00 AM    HDL Cholesterol 34 03/13/2018 04:00 AM    LDL, calculated 53.6 03/13/2018 04:00 AM    Triglyceride 57 03/13/2018 04:00 AM     No components found for: GPT        Impression / Plan:        ICD-10-CM ICD-9-CM    1. Seasonal allergic rhinitis due to pollen  J30.1 477.0 loratadine (Claritin) 10 mg tablet      fluticasone propionate (FLONASE) 50 mcg/actuation nasal spray   2. Fatigue, unspecified type  R53.83 780.79    3. Elevated random blood glucose level  R73.09 790.29 AMB POC GLUCOSE BLOOD, BY GLUCOSE MONITORING DEVICE   4. Chronic gastritis without bleeding, unspecified gastritis type  K29.50 535.10 omeprazole (PRILOSEC) 40 mg capsule   5. Weight gain  R63.5 783.1      GERD-avoid spicy greasy food    Fatigue, weight gain-advised diet, exercise, weight loss    Nosebleeds-improved, asked patient not to blow his nose to hard, reassured patient that platelet counts are normal he does not have any underlying bleeding disorder. Explained to patient risk benefits of the medications. Advised patient to stop meds if having any side effects. Pt verbalized understanding of the instructions. I have discussed the diagnosis with the patient and the intended plan as seen in the above orders. The patient has received an after-visit summary and questions were answered concerning future plans. I have discussed medication side effects and warnings with the patient as well. I have reviewed the plan of care with the patient, accepted their input and they are in agreement with the treatment goals. Reviewed plan of care. Patient has provided input and agrees with goals. Follow-up and Dispositions    · Return in about 4 months (around 10/18/2021) for FULL PHYSICAL - 30 minutes.          Niki Villaseñor MD

## 2021-11-24 ENCOUNTER — OFFICE VISIT (OUTPATIENT)
Dept: PRIMARY CARE CLINIC | Age: 36
End: 2021-11-24
Payer: MEDICAID

## 2021-11-24 VITALS
SYSTOLIC BLOOD PRESSURE: 105 MMHG | HEIGHT: 68 IN | WEIGHT: 221 LBS | HEART RATE: 95 BPM | BODY MASS INDEX: 33.49 KG/M2 | RESPIRATION RATE: 18 BRPM | DIASTOLIC BLOOD PRESSURE: 73 MMHG | TEMPERATURE: 97.1 F | OXYGEN SATURATION: 99 %

## 2021-11-24 DIAGNOSIS — E55.9 VITAMIN D DEFICIENCY: ICD-10-CM

## 2021-11-24 DIAGNOSIS — R63.5 WEIGHT GAIN: ICD-10-CM

## 2021-11-24 DIAGNOSIS — Z23 NEED FOR VACCINATION: ICD-10-CM

## 2021-11-24 DIAGNOSIS — J30.1 SEASONAL ALLERGIC RHINITIS DUE TO POLLEN: ICD-10-CM

## 2021-11-24 DIAGNOSIS — K29.50 CHRONIC GASTRITIS WITHOUT BLEEDING, UNSPECIFIED GASTRITIS TYPE: ICD-10-CM

## 2021-11-24 DIAGNOSIS — E66.9 OBESITY (BMI 30-39.9): ICD-10-CM

## 2021-11-24 DIAGNOSIS — Z00.00 ANNUAL PHYSICAL EXAM: Primary | ICD-10-CM

## 2021-11-24 DIAGNOSIS — R73.09 ELEVATED RANDOM BLOOD GLUCOSE LEVEL: ICD-10-CM

## 2021-11-24 DIAGNOSIS — K59.09 OTHER CONSTIPATION: ICD-10-CM

## 2021-11-24 PROCEDURE — 99385 PREV VISIT NEW AGE 18-39: CPT | Performed by: INTERNAL MEDICINE

## 2021-11-24 PROCEDURE — 90715 TDAP VACCINE 7 YRS/> IM: CPT | Performed by: INTERNAL MEDICINE

## 2021-11-24 PROCEDURE — 99212 OFFICE O/P EST SF 10 MIN: CPT | Performed by: INTERNAL MEDICINE

## 2021-11-24 PROCEDURE — 90630 INFLUENZA VACC IIV4 SPLIT VIRUS PRSRV FREE ID: CPT | Performed by: INTERNAL MEDICINE

## 2021-11-24 RX ORDER — OMEPRAZOLE 40 MG/1
40 CAPSULE, DELAYED RELEASE ORAL DAILY
Qty: 90 CAPSULE | Refills: 1 | Status: SHIPPED | OUTPATIENT
Start: 2021-11-24

## 2021-11-24 RX ORDER — POLYETHYLENE GLYCOL 3350 17 G/17G
17 POWDER, FOR SOLUTION ORAL DAILY
Qty: 850 G | Refills: 3 | Status: SHIPPED | OUTPATIENT
Start: 2021-11-24

## 2021-11-24 RX ORDER — FLUTICASONE PROPIONATE 50 MCG
2 SPRAY, SUSPENSION (ML) NASAL DAILY
Qty: 2 EACH | Refills: 5 | Status: SHIPPED | OUTPATIENT
Start: 2021-11-24

## 2021-11-24 RX ORDER — METFORMIN HYDROCHLORIDE 500 MG/1
500 TABLET ORAL 2 TIMES DAILY WITH MEALS
Qty: 180 TABLET | Refills: 1 | Status: SHIPPED | OUTPATIENT
Start: 2021-11-24

## 2021-11-24 RX ORDER — LORATADINE 10 MG/1
10 TABLET ORAL DAILY
Qty: 90 TABLET | Refills: 1 | Status: SHIPPED | OUTPATIENT
Start: 2021-11-24

## 2021-11-24 NOTE — PROGRESS NOTES
Chief Complaint   Patient presents with    Physical        Visit Vitals  /73 (BP 1 Location: Left upper arm, BP Patient Position: Sitting)   Pulse 95   Temp 97.1 °F (36.2 °C) (Temporal)   Resp 18   Ht 5' 8\" (1.727 m)   Wt 221 lb (100.2 kg)   SpO2 99%   BMI 33.60 kg/m²        1. Have you been to the ER, urgent care clinic since your last visit? Hospitalized since your last visit? No    2. Have you seen or consulted any other health care providers outside of the [address] [place] [address] since your last visit? Include any pap smears or colon screening.  No

## 2021-11-24 NOTE — PROGRESS NOTES
Baudilio Nogueira is a 29 y.o.  male and presents with     Chief Complaint   Patient presents with    Physical     Pt is here for a physical.  Pt has gained about 40 pounds over past 4 years. NO FH for CAD or DM. Pt lives with wife , kids and mother. Father passed away. Had breathing problems  Does not drinking alcohol, does ecigarettes. Pt works  At [employer] at nights. Pt eats a big meal.  Pt works hard but no separate exercise. Pt has his own personal . Pt has gained wt and wants to loose wt. Pt drinks monster drink to keep bpwels moving. Past Medical History:   Diagnosis Date    COVID-19 01/09/2021    still PCR + 1/18/21     No past surgical history on file. Current Outpatient Medications   Medication Sig    omeprazole (PRILOSEC) 40 mg capsule Take 1 Capsule by mouth daily.  loratadine (Claritin) 10 mg tablet Take 1 Tablet by mouth daily.  fluticasone propionate (FLONASE) 50 mcg/actuation nasal spray 2 Sprays by Both Nostrils route daily. One spray each nostril daily    metFORMIN (GLUCOPHAGE) 500 mg tablet Take 1 Tablet by mouth two (2) times daily (with meals).  polyethylene glycol (MIRALAX) 17 gram/dose powder Take 17 g by mouth daily. No current facility-administered medications for this visit. Health Maintenance   Topic Date Due    COVID-19 Vaccine (1) Never done    DTaP/Tdap/Td series (2 - Td or Tdap) 11/24/2031    Flu Vaccine  Completed    Pneumococcal 0-64 years  Aged Out    Hepatitis C Screening  Discontinued     Immunization History   Administered Date(s) Administered    Influenza Vaccine (Quad) Intradermal PF 11/24/2021    Influenza Vaccine (Quad) PF (>6 Mo Flulaval, Fluarix, and >3 Yrs [place]fluria, Fluzone [address]) 03/14/2018    Tdap 11/24/2021     No LMP for male patient. Allergies and Intolerances:   No Known Allergies    Family History:   No family history on file. Social History:   He  reports that he quit smoking about 3 years ago. He started smoking about 14 years ago. He smoked 1.00 pack per day. He has never used smokeless tobacco.  He  reports no history of alcohol use. Review of Systems:   General: negative for - chills, fatigue, fever, weight change  Psych: negative for - anxiety, depression, irritability or mood swings  ENT: negative for - headaches, hearing change, nasal congestion, oral lesions, sneezing or sore throat  Heme/ Lymph: negative for - bleeding problems, bruising, pallor or swollen lymph nodes  Endo: negative for - hot flashes, polydipsia/polyuria or temperature intolerance  Resp: negative for - cough, shortness of breath or wheezing  CV: negative for - chest pain, edema or palpitations  GI: negative for - abdominal pain, change in bowel habits, constipation, diarrhea or nausea/vomiting  : negative for - dysuria, hematuria, incontinence, pelvic pain or vulvar/vaginal symptoms  MSK: negative for - joint pain, joint swelling or muscle pain  Neuro: negative for - confusion, headaches, seizures or weakness  Derm: negative for - dry skin, hair changes, rash or skin lesion changes          Physical:   Vitals:   Vitals:    11/24/21 1118   BP: 105/73   Pulse: 95   Resp: 18   Temp: 97.1 °F (36.2 °C)   TempSrc: Temporal   SpO2: 99%   Weight: 221 lb (100.2 kg)   Height: 5' 8\" (1.727 m)           Exam:   HEENT- atraumatic,normocephalic, awake, oriented, well nourished  Neck - supple,no enlarged lymph nodes, no JVD, no thyromegaly  Chest- CTA, no rhonchi, no crackles  Heart- rrr, no murmurs / gallop/rub  Abdomen- soft,BS+,NT, no hepatosplenomegaly  Ext - no c/c/edema   Neuro- no focal deficits. Power 5/5 all extremities  Skin - warm,dry, no obvious rashes.           Review of Data:   LABS:   Lab Results   Component Value Date/Time    WBC 6.9 05/21/2021 09:48 AM    HGB 14.9 05/21/2021 09:48 AM    HCT 44.2 05/21/2021 09:48 AM    PLATELET 849 94/69/6294 09:48 AM     Lab Results   Component Value Date/Time    Sodium 136 11/24/2021 01:23 PM    Potassium 4.5 11/24/2021 01:23 PM    Chloride 101 11/24/2021 01:23 PM    CO2 29 11/24/2021 01:23 PM    Glucose 109 (H) 11/24/2021 01:23 PM    BUN 18 11/24/2021 01:23 PM    Creatinine 0.87 11/24/2021 01:23 PM     Lab Results   Component Value Date/Time    Cholesterol, total 198 11/24/2021 01:23 PM    HDL Cholesterol 33 11/24/2021 01:23 PM    LDL, calculated 102.4 (H) 11/24/2021 01:23 PM    Triglyceride 313 (H) 11/24/2021 01:23 PM     No components found for: GPT        Impression / Plan:        ICD-10-CM ICD-9-CM    1. Annual physical exam  Z00.00 V70.0 LIPID PANEL      VITAMIN D, 25 HYDROXY      HEMOGLOBIN A1C WITH EAG      METABOLIC PANEL, COMPREHENSIVE   2. Seasonal allergic rhinitis due to pollen  J30.1 477.0 loratadine (Claritin) 10 mg tablet      fluticasone propionate (FLONASE) 50 mcg/actuation nasal spray   3. Weight gain  R63.5 783.1 HEMOGLOBIN A1C WITH EAG   4. Vitamin D deficiency  E55.9 268.9 VITAMIN D, 25 HYDROXY   5. Chronic gastritis without bleeding, unspecified gastritis type  K29.50 535.10 omeprazole (PRILOSEC) 40 mg capsule   6. Elevated random blood glucose level  R73.09 790.29 HEMOGLOBIN A1C WITH EAG   7. Obesity (BMI 30-39. 9)  E66.9 278.00 metFORMIN (GLUCOPHAGE) 500 mg tablet   8. Need for vaccination  Z23 V05.9 TETANUS, DIPHTHERIA TOXOIDS AND ACELLULAR PERTUSSIS VACCINE (TDAP), IN INDIVIDS. >=7, IM      INFLUENZA VACC IIV4 SPLIT VIRUS PRSRV FREE ID   9. Other constipation  K59.09 564.09 polyethylene glycol (MIRALAX) 17 gram/dose powder     Constipation-increase fiber intake, more fruits and vegetables. Weight gain-stop drinking monster drink. Explained to patient risk benefits of the medications. Advised patient to stop meds if having any side effects. Pt verbalized understanding of the instructions. I have discussed the diagnosis with the patient and the intended plan as seen in the above orders.   The patient has received an after-visit summary and questions were answered concerning future plans. I have discussed medication side effects and warnings with the patient as well. I have reviewed the plan of care with the patient, accepted their input and they are in agreement with the treatment goals. Reviewed plan of care. Patient has provided input and agrees with goals. Follow-up and Dispositions    · Return in about 6 months (around 5/24/2022).          Clare Lara MD

## 2021-11-26 DIAGNOSIS — E55.9 VITAMIN D DEFICIENCY: Primary | ICD-10-CM

## 2021-11-26 DIAGNOSIS — E78.5 HYPERLIPIDEMIA, UNSPECIFIED HYPERLIPIDEMIA TYPE: ICD-10-CM

## 2021-11-26 RX ORDER — ERGOCALCIFEROL 1.25 MG/1
50000 CAPSULE ORAL
Qty: 12 CAPSULE | Refills: 1 | Status: SHIPPED | OUTPATIENT
Start: 2021-11-26

## 2021-11-26 RX ORDER — GLUCOSAM/CHONDRO/HERB 149/HYAL 750-100 MG
1 TABLET ORAL 2 TIMES DAILY
Qty: 180 CAPSULE | Refills: 1 | Status: SHIPPED | OUTPATIENT
Start: 2021-11-26

## 2022-03-18 PROBLEM — U07.1 COVID-19: Status: ACTIVE | Noted: 2021-01-09

## 2022-04-30 ENCOUNTER — HOSPITAL ENCOUNTER (EMERGENCY)
Age: 37
Discharge: STILL ADMITTED OR ADMINISTRATIVE | End: 2022-04-30

## 2022-12-14 ENCOUNTER — OFFICE VISIT (OUTPATIENT)
Dept: PRIMARY CARE CLINIC | Age: 37
End: 2022-12-14
Payer: MEDICAID

## 2022-12-14 VITALS
WEIGHT: 210 LBS | TEMPERATURE: 97.8 F | HEART RATE: 89 BPM | RESPIRATION RATE: 18 BRPM | SYSTOLIC BLOOD PRESSURE: 103 MMHG | DIASTOLIC BLOOD PRESSURE: 66 MMHG | BODY MASS INDEX: 31.83 KG/M2 | OXYGEN SATURATION: 98 % | HEIGHT: 68 IN

## 2022-12-14 DIAGNOSIS — Z00.00 ANNUAL PHYSICAL EXAM: ICD-10-CM

## 2022-12-14 DIAGNOSIS — K21.9 GASTROESOPHAGEAL REFLUX DISEASE WITHOUT ESOPHAGITIS: ICD-10-CM

## 2022-12-14 DIAGNOSIS — J45.20 MILD INTERMITTENT ASTHMA, UNSPECIFIED WHETHER COMPLICATED: ICD-10-CM

## 2022-12-14 DIAGNOSIS — J30.1 SEASONAL ALLERGIC RHINITIS DUE TO POLLEN: ICD-10-CM

## 2022-12-14 DIAGNOSIS — Z23 ENCOUNTER FOR IMMUNIZATION: Primary | ICD-10-CM

## 2022-12-14 RX ORDER — LORATADINE 10 MG/1
10 TABLET ORAL DAILY
Qty: 90 TABLET | Refills: 1 | Status: SHIPPED | OUTPATIENT
Start: 2022-12-14

## 2022-12-14 RX ORDER — OMEPRAZOLE 40 MG/1
40 CAPSULE, DELAYED RELEASE ORAL DAILY
Qty: 90 CAPSULE | Refills: 1 | Status: SHIPPED | OUTPATIENT
Start: 2022-12-14

## 2022-12-14 RX ORDER — FLUTICASONE PROPIONATE 50 MCG
2 SPRAY, SUSPENSION (ML) NASAL DAILY
Qty: 1 EACH | Refills: 5 | Status: SHIPPED | OUTPATIENT
Start: 2022-12-14

## 2022-12-14 RX ORDER — ALBUTEROL SULFATE 90 UG/1
1 AEROSOL, METERED RESPIRATORY (INHALATION)
Qty: 18 G | Refills: 5 | Status: SHIPPED | OUTPATIENT
Start: 2022-12-14

## 2022-12-14 NOTE — PROGRESS NOTES
Clarence Ramirez is a 40 y.o.  male and presents with     Chief Complaint   Patient presents with    Physical     Pt has had dry cough for few weeks. Pt works in the [employer] section of [place]. Pt has difficulty breathing   Smokes occasioanally. Pt has symptoms of GERD. Pt has runny nose. Has history of mild asthma  He reports that every time he works in the [employer] 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 Current Outpatient Medications   Medication Sig    loratadine (Claritin) 10 mg tablet Take 1 Tablet by mouth daily. fluticasone propionate (FLONASE) 50 mcg/actuation nasal spray 2 Sprays by Both Nostrils route daily. omeprazole (PRILOSEC) 40 mg capsule Take 1 Capsule by mouth daily. albuterol (PROVENTIL HFA, VENTOLIN HFA, PROAIR HFA) 90 mcg/actuation inhaler Take 1 Puff by inhalation every six (6) hours as needed for Wheezing. fluticasone propionate (FLONASE) 50 mcg/actuation nasal spray 2 Sprays by Both Nostrils route daily. One spray each nostril daily    omega 3-DHA-EPA-fish oil (Fish OiL) 1,000 mg (120 mg-180 mg) capsule Take 1 Capsule by mouth two (2) times a day. (Patient not taking: Reported on 12/14/2022)     No current facility-administered medications for this visit.      Health Maintenance   Topic Date Due    COVID-19 Vaccine (3 - Booster for Pfizer series) 12/31/2023 (Originally 5/25/2022)    Depression Screen  12/14/2023    DTaP/Tdap/Td series (2 - Td or Tdap) 11/24/2031    Flu Vaccine  Completed    Pneumococcal 0-64 years  Aged Out    Hepatitis C Screening  Discontinued     Immunization History   Administered Date(s) Administered    COVID-19, PFIZER PURPLE top, DILUTE for use, (age 15 y+), IM, 30mcg/0.3mL 03/09/2022, 03/30/2022    Influenza Vaccine (Quad) Intradermal PF 11/24/2021    Influenza, FLUARIX, FLULAVAL, FLUZONE (age 10 mo+) AND AFLURIA, (age 1 y+), PF, 0.5mL 03/14/2018, 12/14/2022    Tdap 11/24/2021     No LMP for male patient. Allergies and Intolerances:   No Known Allergies    Family History:   No family history on file. Social History:   He  reports that he quit smoking about 4 years ago. He started smoking about 15 years ago. He smoked an average of 1 pack per day. He has never used smokeless tobacco.  He  reports no history of alcohol use.             Review of Systems:   General: negative for - chills, fatigue, fever, weight change  Psych: negative for - anxiety, depression, irritability or mood swings  ENT: negative for - headaches, hearing change, nasal congestion, oral lesions, sneezing or sore throat  Heme/ Lymph: negative for - bleeding problems, bruising, pallor or swollen lymph nodes  Endo: negative for - hot flashes, polydipsia/polyuria or temperature intolerance  Resp: negative for - cough, shortness of breath or wheezing  CV: negative for - chest pain, edema or palpitations  GI: negative for - abdominal pain, change in bowel habits, constipation, diarrhea or nausea/vomiting  : negative for - dysuria, hematuria, incontinence, pelvic pain or vulvar/vaginal symptoms  MSK: negative for - joint pain, joint swelling or muscle pain  Neuro: negative for - confusion, headaches, seizures or weakness  Derm: negative for - dry skin, hair changes, rash or skin lesion changes          Physical:   Vitals:   Vitals:    12/14/22 1015   BP: 103/66   Pulse: 89   Resp: 18   Temp: 97.8 °F (36.6 °C)   TempSrc: Temporal   SpO2: 98%   Weight: 210 lb (95.3 kg)   Height: 5' 8\" (1.727 m)           Exam:   HEENT- atraumatic,normocephalic, awake, oriented, well nourished, nose and throat congested  Neck - supple,no enlarged lymph nodes, no JVD, no thyromegaly  Chest- CTA, no rhonchi, no crackles  Heart- rrr, no murmurs / gallop/rub  Abdomen- soft,BS+,NT, no hepatosplenomegaly  Ext - no c/c/edema   Neuro- no focal deficits. Power 5/5 all extremities  Skin - warm,dry, no obvious rashes. Review of Data:   LABS:   Lab Results   Component Value Date/Time    WBC 6.9 05/21/2021 09:48 AM    HGB 14.9 05/21/2021 09:48 AM    HCT 44.2 05/21/2021 09:48 AM    PLATELET 760 69/57/3354 09:48 AM     Lab Results   Component Value Date/Time    Sodium 136 11/24/2021 01:23 PM    Potassium 4.5 11/24/2021 01:23 PM    Chloride 101 11/24/2021 01:23 PM    CO2 29 11/24/2021 01:23 PM    Glucose 109 (H) 11/24/2021 01:23 PM    BUN 18 11/24/2021 01:23 PM    Creatinine 0.87 11/24/2021 01:23 PM     Lab Results   Component Value Date/Time    Cholesterol, total 198 11/24/2021 01:23 PM    HDL Cholesterol 33 11/24/2021 01:23 PM    LDL, calculated 102.4 (H) 11/24/2021 01:23 PM    Triglyceride 313 (H) 11/24/2021 01:23 PM     No components found for: GPT        Impression / Plan:        ICD-10-CM ICD-9-CM    1. Encounter for immunization  Z23 V03.89 INFLUENZA, FLUARIX, FLULAVAL, FLUZONE (AGE 6 MO+), AFLURIA(AGE 3Y+) IM, PF, 0.5 ML      2. Seasonal allergic rhinitis due to pollen  J30.1 477.0 loratadine (Claritin) 10 mg tablet      fluticasone propionate (FLONASE) 50 mcg/actuation nasal spray      3. Gastroesophageal reflux disease without esophagitis  K21.9 530.81 omeprazole (PRILOSEC) 40 mg capsule      4. Annual physical exam  Z00.00 V70.0 CBC WITH AUTOMATED DIFF      METABOLIC PANEL, COMPREHENSIVE      LIPID PANEL      VITAMIN D, 25 HYDROXY      HEMOGLOBIN A1C WITH EAG      5. Mild intermittent asthma, unspecified whether complicated  C48.92 054.69 albuterol (PROVENTIL HFA, VENTOLIN HFA, PROAIR HFA) 90 mcg/actuation inhaler      Letter given to the patient      Explained to patient risk benefits of the medications. Advised patient to stop meds if having any side effects. Pt verbalized understanding of the instructions.     I have discussed the diagnosis with the patient and the intended plan as seen in the above orders. The patient has received an after-visit summary and questions were answered concerning future plans. I have discussed medication side effects and warnings with the patient as well. I have reviewed the plan of care with the patient, accepted their input and they are in agreement with the treatment goals. Reviewed plan of care. Patient has provided input and agrees with goals. Follow-up and Dispositions    Return in about 6 months (around 6/14/2023).          Matt Serra MD

## 2022-12-14 NOTE — LETTER
12/14/2022 10:44 AM    Mr. 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 Patient will be re evaluated in six months.           Sincerely,      Hadley Contreras MD

## 2022-12-14 NOTE — PROGRESS NOTES
Chief Complaint   Patient presents with    Physical        Visit Vitals  /66 (BP 1 Location: Left upper arm, BP Patient Position: Sitting)   Pulse 89   Temp 97.8 °F (36.6 °C) (Temporal)   Resp 18   Ht 5' 8\" (1.727 m)   Wt 210 lb (95.3 kg)   SpO2 98%   BMI 31.93 kg/m²        Health Maintenance Due   Topic    Flu Vaccine (1)        1. \"Have you been to the ER, urgent care clinic since your last visit? Hospitalized since your last visit? \" No    2. \"Have you seen or consulted any other health care providers outside of the [address] [place] [address] since your last visit? \" No     3. For patients aged 39-70: Has the patient had a colonoscopy / FIT/ Cologuard? NA - based on age      If the patient is female:    4. For patients aged 41-77: Has the patient had a mammogram within the past 2 years? NA - based on age or sex      11. For patients aged 21-65: Has the patient had a pap smear?  NA - based on age or sex

## 2022-12-15 LAB
25(OH)D3 SERPL-MCNC: 13.2 NG/ML (ref 30–100)
ALBUMIN SERPL-MCNC: 4.2 G/DL (ref 3.5–5)
ALBUMIN/GLOB SERPL: 1.2 {RATIO} (ref 1.1–2.2)
ALP SERPL-CCNC: 68 U/L (ref 45–117)
ALT SERPL-CCNC: 49 U/L (ref 12–78)
ANION GAP SERPL CALC-SCNC: 3 MMOL/L (ref 5–15)
AST SERPL-CCNC: 17 U/L (ref 15–37)
BASOPHILS # BLD: 0.1 K/UL (ref 0–0.1)
BASOPHILS NFR BLD: 1 % (ref 0–1)
BILIRUB SERPL-MCNC: 0.6 MG/DL (ref 0.2–1)
BUN SERPL-MCNC: 17 MG/DL (ref 6–20)
BUN/CREAT SERPL: 21 (ref 12–20)
CALCIUM SERPL-MCNC: 9.2 MG/DL (ref 8.5–10.1)
CHLORIDE SERPL-SCNC: 104 MMOL/L (ref 97–108)
CHOLEST SERPL-MCNC: 196 MG/DL
CO2 SERPL-SCNC: 30 MMOL/L (ref 21–32)
CREAT SERPL-MCNC: 0.82 MG/DL (ref 0.7–1.3)
DIFFERENTIAL METHOD BLD: ABNORMAL
EOSINOPHIL # BLD: 0.2 K/UL (ref 0–0.4)
EOSINOPHIL NFR BLD: 3 % (ref 0–7)
ERYTHROCYTE [DISTWIDTH] IN BLOOD BY AUTOMATED COUNT: 13.2 % (ref 11.5–14.5)
EST. AVERAGE GLUCOSE BLD GHB EST-MCNC: 114 MG/DL
GLOBULIN SER CALC-MCNC: 3.5 G/DL (ref 2–4)
GLUCOSE SERPL-MCNC: 86 MG/DL (ref 65–100)
HBA1C MFR BLD: 5.6 % (ref 4–5.6)
HCT VFR BLD AUTO: 46.4 % (ref 36.6–50.3)
HDLC SERPL-MCNC: 37 MG/DL
HDLC SERPL: 5.3 {RATIO} (ref 0–5)
HGB BLD-MCNC: 15.3 G/DL (ref 12.1–17)
IMM GRANULOCYTES # BLD AUTO: 0 K/UL (ref 0–0.04)
IMM GRANULOCYTES NFR BLD AUTO: 1 % (ref 0–0.5)
LDLC SERPL CALC-MCNC: 83.4 MG/DL (ref 0–100)
LYMPHOCYTES # BLD: 2.4 K/UL (ref 0.8–3.5)
LYMPHOCYTES NFR BLD: 38 % (ref 12–49)
MCH RBC QN AUTO: 27.3 PG (ref 26–34)
MCHC RBC AUTO-ENTMCNC: 33 G/DL (ref 30–36.5)
MCV RBC AUTO: 82.7 FL (ref 80–99)
MONOCYTES # BLD: 0.6 K/UL (ref 0–1)
MONOCYTES NFR BLD: 10 % (ref 5–13)
NEUTS SEG # BLD: 2.9 K/UL (ref 1.8–8)
NEUTS SEG NFR BLD: 47 % (ref 32–75)
NRBC # BLD: 0 K/UL (ref 0–0.01)
NRBC BLD-RTO: 0 PER 100 WBC
PLATELET # BLD AUTO: 280 K/UL (ref 150–400)
PMV BLD AUTO: 9.8 FL (ref 8.9–12.9)
POTASSIUM SERPL-SCNC: 4 MMOL/L (ref 3.5–5.1)
PROT SERPL-MCNC: 7.7 G/DL (ref 6.4–8.2)
RBC # BLD AUTO: 5.61 M/UL (ref 4.1–5.7)
SODIUM SERPL-SCNC: 137 MMOL/L (ref 136–145)
TRIGL SERPL-MCNC: 378 MG/DL (ref ?–150)
VLDLC SERPL CALC-MCNC: 75.6 MG/DL
WBC # BLD AUTO: 6.2 K/UL (ref 4.1–11.1)

## 2022-12-19 DIAGNOSIS — E55.9 VITAMIN D DEFICIENCY: ICD-10-CM

## 2022-12-19 RX ORDER — ERGOCALCIFEROL 1.25 MG/1
50000 CAPSULE ORAL
Qty: 12 CAPSULE | Refills: 1 | Status: SHIPPED | OUTPATIENT
Start: 2022-12-19

## 2022-12-19 NOTE — PROGRESS NOTES
Vitamin D levels are low. Sending vitamin D to pharmacy  Rest of the labs are all normal including kidney and liver function cholesterol and blood count.   Hemoglobin A1c is normal

## 2023-06-06 ENCOUNTER — OFFICE VISIT (OUTPATIENT)
Dept: PRIMARY CARE CLINIC | Facility: CLINIC | Age: 38
End: 2023-06-06
Payer: MEDICAID

## 2023-06-06 VITALS
DIASTOLIC BLOOD PRESSURE: 71 MMHG | HEIGHT: 68 IN | SYSTOLIC BLOOD PRESSURE: 107 MMHG | WEIGHT: 200 LBS | HEART RATE: 94 BPM | RESPIRATION RATE: 18 BRPM | BODY MASS INDEX: 30.31 KG/M2 | OXYGEN SATURATION: 99 % | TEMPERATURE: 97.8 F

## 2023-06-06 DIAGNOSIS — R73.03 PREDIABETES: ICD-10-CM

## 2023-06-06 DIAGNOSIS — E66.9 OBESITY (BMI 30-39.9): ICD-10-CM

## 2023-06-06 DIAGNOSIS — R06.2 WHEEZING: ICD-10-CM

## 2023-06-06 DIAGNOSIS — E53.8 B12 DEFICIENCY: ICD-10-CM

## 2023-06-06 DIAGNOSIS — J33.0 NASAL POLYP, POSTERIOR: ICD-10-CM

## 2023-06-06 DIAGNOSIS — K21.9 GASTROESOPHAGEAL REFLUX DISEASE WITHOUT ESOPHAGITIS: ICD-10-CM

## 2023-06-06 DIAGNOSIS — E78.1 PURE HYPERTRIGLYCERIDEMIA: ICD-10-CM

## 2023-06-06 DIAGNOSIS — E55.9 VITAMIN D DEFICIENCY, UNSPECIFIED: ICD-10-CM

## 2023-06-06 DIAGNOSIS — E55.9 VITAMIN D DEFICIENCY, UNSPECIFIED: Primary | ICD-10-CM

## 2023-06-06 PROCEDURE — 99214 OFFICE O/P EST MOD 30 MIN: CPT | Performed by: INTERNAL MEDICINE

## 2023-06-06 RX ORDER — FLUTICASONE PROPIONATE 50 MCG
2 SPRAY, SUSPENSION (ML) NASAL DAILY
Qty: 16 G | Refills: 4 | Status: SHIPPED | OUTPATIENT
Start: 2023-06-06

## 2023-06-06 RX ORDER — ALBUTEROL SULFATE 90 UG/1
1 AEROSOL, METERED RESPIRATORY (INHALATION) EVERY 6 HOURS PRN
Qty: 18 G | Refills: 4 | Status: SHIPPED | OUTPATIENT
Start: 2023-06-06

## 2023-06-06 RX ORDER — LORATADINE 10 MG/1
10 TABLET ORAL DAILY
Qty: 90 TABLET | Refills: 1 | Status: SHIPPED | OUTPATIENT
Start: 2023-06-06

## 2023-06-06 RX ORDER — OMEPRAZOLE 40 MG/1
40 CAPSULE, DELAYED RELEASE ORAL DAILY
Qty: 90 CAPSULE | Refills: 1 | Status: SHIPPED | OUTPATIENT
Start: 2023-06-06

## 2023-06-06 SDOH — ECONOMIC STABILITY: HOUSING INSECURITY
IN THE LAST 12 MONTHS, WAS THERE A TIME WHEN YOU DID NOT HAVE A STEADY PLACE TO SLEEP OR SLEPT IN A SHELTER (INCLUDING NOW)?: PATIENT REFUSED

## 2023-06-06 SDOH — ECONOMIC STABILITY: FOOD INSECURITY: WITHIN THE PAST 12 MONTHS, YOU WORRIED THAT YOUR FOOD WOULD RUN OUT BEFORE YOU GOT MONEY TO BUY MORE.: PATIENT DECLINED

## 2023-06-06 SDOH — ECONOMIC STABILITY: FOOD INSECURITY: WITHIN THE PAST 12 MONTHS, THE FOOD YOU BOUGHT JUST DIDN'T LAST AND YOU DIDN'T HAVE MONEY TO GET MORE.: PATIENT DECLINED

## 2023-06-06 SDOH — ECONOMIC STABILITY: INCOME INSECURITY: HOW HARD IS IT FOR YOU TO PAY FOR THE VERY BASICS LIKE FOOD, HOUSING, MEDICAL CARE, AND HEATING?: PATIENT DECLINED

## 2023-06-06 ASSESSMENT — PATIENT HEALTH QUESTIONNAIRE - PHQ9
SUM OF ALL RESPONSES TO PHQ QUESTIONS 1-9: 0
1. LITTLE INTEREST OR PLEASURE IN DOING THINGS: 0
SUM OF ALL RESPONSES TO PHQ QUESTIONS 1-9: 0
2. FEELING DOWN, DEPRESSED OR HOPELESS: 0
SUM OF ALL RESPONSES TO PHQ QUESTIONS 1-9: 0
SUM OF ALL RESPONSES TO PHQ9 QUESTIONS 1 & 2: 0
SUM OF ALL RESPONSES TO PHQ QUESTIONS 1-9: 0

## 2023-06-06 NOTE — PROGRESS NOTES
Chief Complaint   Patient presents with    Follow-up     Test results from lab worl       There were no vitals taken for this visit. 1. Have you been to the ER, urgent care clinic since your last visit? Hospitalized since your last visit? No    2. Have you seen or consulted any other health care providers outside of the [address] [place] [address] since your last visit? Include any pap smears or colon screening. No      3. For patients aged 39-70: Has the patient had a colonoscopy / FIT/ Cologuard?  no
specified.     Esvin Aponte MD

## 2023-06-07 LAB
25(OH)D3 SERPL-MCNC: 19.7 NG/ML (ref 30–100)
ALBUMIN SERPL-MCNC: 4.2 G/DL (ref 3.5–5)
ALBUMIN/GLOB SERPL: 1.1 (ref 1.1–2.2)
ALP SERPL-CCNC: 75 U/L (ref 45–117)
ALT SERPL-CCNC: 47 U/L (ref 12–78)
ANION GAP SERPL CALC-SCNC: 4 MMOL/L (ref 5–15)
AST SERPL-CCNC: 27 U/L (ref 15–37)
BILIRUB SERPL-MCNC: 0.6 MG/DL (ref 0.2–1)
BUN SERPL-MCNC: 15 MG/DL (ref 6–20)
BUN/CREAT SERPL: 17 (ref 12–20)
CALCIUM SERPL-MCNC: 9.1 MG/DL (ref 8.5–10.1)
CHLORIDE SERPL-SCNC: 102 MMOL/L (ref 97–108)
CHOLEST SERPL-MCNC: 190 MG/DL
CO2 SERPL-SCNC: 31 MMOL/L (ref 21–32)
CREAT SERPL-MCNC: 0.88 MG/DL (ref 0.7–1.3)
EST. AVERAGE GLUCOSE BLD GHB EST-MCNC: 105 MG/DL
FOLATE SERPL-MCNC: 18.1 NG/ML (ref 5–21)
GLOBULIN SER CALC-MCNC: 3.7 G/DL (ref 2–4)
GLUCOSE SERPL-MCNC: 94 MG/DL (ref 65–100)
HBA1C MFR BLD: 5.3 % (ref 4–5.6)
HDLC SERPL-MCNC: 37 MG/DL
HDLC SERPL: 5.1 (ref 0–5)
LDLC SERPL CALC-MCNC: ABNORMAL MG/DL (ref 0–100)
LDLC SERPL DIRECT ASSAY-MCNC: 94 MG/DL (ref 0–100)
POTASSIUM SERPL-SCNC: 3.9 MMOL/L (ref 3.5–5.1)
PROT SERPL-MCNC: 7.9 G/DL (ref 6.4–8.2)
SODIUM SERPL-SCNC: 137 MMOL/L (ref 136–145)
TRIGL SERPL-MCNC: 500 MG/DL
VIT B12 SERPL-MCNC: 565 PG/ML (ref 193–986)
VLDLC SERPL CALC-MCNC: ABNORMAL MG/DL

## 2023-06-11 DIAGNOSIS — E78.1 PURE HYPERTRIGLYCERIDEMIA: Primary | ICD-10-CM

## 2023-06-11 DIAGNOSIS — E55.9 VITAMIN D DEFICIENCY, UNSPECIFIED: ICD-10-CM

## 2023-06-11 RX ORDER — FENOFIBRATE 145 MG/1
145 TABLET, COATED ORAL DAILY
Qty: 30 TABLET | Refills: 3 | Status: SHIPPED | OUTPATIENT
Start: 2023-06-11

## 2023-06-11 RX ORDER — ERGOCALCIFEROL 1.25 MG/1
50000 CAPSULE ORAL
Qty: 12 CAPSULE | Refills: 1 | Status: SHIPPED | OUTPATIENT
Start: 2023-06-11

## 2023-08-08 DIAGNOSIS — Z22.7 LATENT TUBERCULOSIS BY BLOOD TEST: Primary | ICD-10-CM

## 2023-08-16 LAB
GAMMA INTERFERON BACKGROUND BLD IA-ACNC: 0.16 IU/ML
M TB IFN-G BLD-IMP: NEGATIVE
M TB IFN-G CD4+ T-CELLS BLD-ACNC: 0.39 IU/ML
M TBIFN-G CD4+ CD8+T-CELLS BLD-ACNC: 0.38 IU/ML
MITOGEN IGNF BLD-ACNC: >10 IU/ML
SERVICE CMNT-IMP: NORMAL

## 2023-12-15 DIAGNOSIS — E55.9 VITAMIN D DEFICIENCY, UNSPECIFIED: ICD-10-CM

## 2023-12-15 DIAGNOSIS — R79.89 LOW TESTOSTERONE IN MALE: ICD-10-CM

## 2023-12-15 DIAGNOSIS — E78.1 PURE HYPERTRIGLYCERIDEMIA: ICD-10-CM

## 2023-12-15 DIAGNOSIS — R53.83 OTHER FATIGUE: ICD-10-CM

## 2023-12-15 LAB
25(OH)D3 SERPL-MCNC: 13.3 NG/ML (ref 30–100)
ALBUMIN SERPL-MCNC: 3.9 G/DL (ref 3.5–5)
ALBUMIN/GLOB SERPL: 1 (ref 1.1–2.2)
ALP SERPL-CCNC: 73 U/L (ref 45–117)
ALT SERPL-CCNC: 41 U/L (ref 12–78)
ANION GAP SERPL CALC-SCNC: 6 MMOL/L (ref 5–15)
AST SERPL-CCNC: 19 U/L (ref 15–37)
BILIRUB SERPL-MCNC: 0.4 MG/DL (ref 0.2–1)
BUN SERPL-MCNC: 12 MG/DL (ref 6–20)
BUN/CREAT SERPL: 15 (ref 12–20)
CALCIUM SERPL-MCNC: 8.8 MG/DL (ref 8.5–10.1)
CHLORIDE SERPL-SCNC: 105 MMOL/L (ref 97–108)
CO2 SERPL-SCNC: 27 MMOL/L (ref 21–32)
COMMENT:: NORMAL
CREAT SERPL-MCNC: 0.8 MG/DL (ref 0.7–1.3)
FSH SERPL-ACNC: 2.9 MIU/ML
GLOBULIN SER CALC-MCNC: 4.1 G/DL (ref 2–4)
GLUCOSE SERPL-MCNC: 138 MG/DL (ref 65–100)
LH SERPL-ACNC: 5.3 MIU/ML
POTASSIUM SERPL-SCNC: 4.2 MMOL/L (ref 3.5–5.1)
PROT SERPL-MCNC: 8 G/DL (ref 6.4–8.2)
SODIUM SERPL-SCNC: 138 MMOL/L (ref 136–145)
SPECIMEN HOLD: NORMAL
TSH SERPL DL<=0.05 MIU/L-ACNC: 0.89 UIU/ML (ref 0.36–3.74)

## 2023-12-16 LAB
CHOLEST SERPL-MCNC: 196 MG/DL
HDLC SERPL-MCNC: 36 MG/DL
HDLC SERPL: 5.4 (ref 0–5)
LDLC SERPL CALC-MCNC: ABNORMAL MG/DL (ref 0–100)
LDLC SERPL DIRECT ASSAY-MCNC: 110 MG/DL (ref 0–100)
TRIGL SERPL-MCNC: 691 MG/DL
VLDLC SERPL CALC-MCNC: ABNORMAL MG/DL

## 2023-12-17 DIAGNOSIS — E55.9 VITAMIN D DEFICIENCY, UNSPECIFIED: ICD-10-CM

## 2023-12-17 RX ORDER — ERGOCALCIFEROL 1.25 MG/1
50000 CAPSULE ORAL
Qty: 12 CAPSULE | Refills: 1 | Status: SHIPPED | OUTPATIENT
Start: 2023-12-17

## 2023-12-21 LAB
TESTOST FREE SERPL-MCNC: ABNORMAL PG/ML
TESTOST SERPL-MCNC: 178 NG/DL (ref 264–916)

## 2024-11-29 ENCOUNTER — OFFICE VISIT (OUTPATIENT)
Dept: PRIMARY CARE CLINIC | Facility: CLINIC | Age: 39
End: 2024-11-29

## 2024-11-29 VITALS
SYSTOLIC BLOOD PRESSURE: 107 MMHG | WEIGHT: 207 LBS | OXYGEN SATURATION: 98 % | HEART RATE: 87 BPM | RESPIRATION RATE: 18 BRPM | HEIGHT: 68 IN | TEMPERATURE: 98.6 F | BODY MASS INDEX: 31.37 KG/M2 | DIASTOLIC BLOOD PRESSURE: 71 MMHG

## 2024-11-29 DIAGNOSIS — Z00.00 ANNUAL PHYSICAL EXAM: ICD-10-CM

## 2024-11-29 DIAGNOSIS — M79.671 PAIN OF BOTH HEELS: ICD-10-CM

## 2024-11-29 DIAGNOSIS — E55.9 VITAMIN D DEFICIENCY, UNSPECIFIED: ICD-10-CM

## 2024-11-29 DIAGNOSIS — E78.1 PURE HYPERTRIGLYCERIDEMIA: ICD-10-CM

## 2024-11-29 DIAGNOSIS — M79.672 PAIN OF BOTH HEELS: ICD-10-CM

## 2024-11-29 DIAGNOSIS — R79.89 LOW TESTOSTERONE IN MALE: ICD-10-CM

## 2024-11-29 DIAGNOSIS — E55.9 VITAMIN D DEFICIENCY, UNSPECIFIED: Primary | ICD-10-CM

## 2024-11-29 SDOH — ECONOMIC STABILITY: FOOD INSECURITY: WITHIN THE PAST 12 MONTHS, YOU WORRIED THAT YOUR FOOD WOULD RUN OUT BEFORE YOU GOT MONEY TO BUY MORE.: NEVER TRUE

## 2024-11-29 SDOH — ECONOMIC STABILITY: FOOD INSECURITY: WITHIN THE PAST 12 MONTHS, THE FOOD YOU BOUGHT JUST DIDN'T LAST AND YOU DIDN'T HAVE MONEY TO GET MORE.: NEVER TRUE

## 2024-11-29 SDOH — ECONOMIC STABILITY: INCOME INSECURITY: HOW HARD IS IT FOR YOU TO PAY FOR THE VERY BASICS LIKE FOOD, HOUSING, MEDICAL CARE, AND HEATING?: NOT HARD AT ALL

## 2024-11-29 ASSESSMENT — PATIENT HEALTH QUESTIONNAIRE - PHQ9
SUM OF ALL RESPONSES TO PHQ QUESTIONS 1-9: 0
2. FEELING DOWN, DEPRESSED OR HOPELESS: NOT AT ALL
SUM OF ALL RESPONSES TO PHQ QUESTIONS 1-9: 0
1. LITTLE INTEREST OR PLEASURE IN DOING THINGS: NOT AT ALL
SUM OF ALL RESPONSES TO PHQ9 QUESTIONS 1 & 2: 0
SUM OF ALL RESPONSES TO PHQ QUESTIONS 1-9: 0
SUM OF ALL RESPONSES TO PHQ QUESTIONS 1-9: 0

## 2024-12-02 LAB
25(OH)D3 SERPL-MCNC: 34.5 NG/ML (ref 30–100)
ALBUMIN SERPL-MCNC: 3.8 G/DL (ref 3.5–5)
ALBUMIN/GLOB SERPL: 1.2 (ref 1.1–2.2)
ALP SERPL-CCNC: 66 U/L (ref 45–117)
ALT SERPL-CCNC: 39 U/L (ref 12–78)
ANION GAP SERPL CALC-SCNC: 3 MMOL/L (ref 2–12)
AST SERPL-CCNC: 16 U/L (ref 15–37)
BILIRUB SERPL-MCNC: 0.5 MG/DL (ref 0.2–1)
BUN SERPL-MCNC: 13 MG/DL (ref 6–20)
BUN/CREAT SERPL: 17 (ref 12–20)
CALCIUM SERPL-MCNC: 9.1 MG/DL (ref 8.5–10.1)
CHLORIDE SERPL-SCNC: 106 MMOL/L (ref 97–108)
CHOLEST SERPL-MCNC: 210 MG/DL
CO2 SERPL-SCNC: 28 MMOL/L (ref 21–32)
CREAT SERPL-MCNC: 0.76 MG/DL (ref 0.7–1.3)
ERYTHROCYTE [DISTWIDTH] IN BLOOD BY AUTOMATED COUNT: 13.3 % (ref 11.5–14.5)
EST. AVERAGE GLUCOSE BLD GHB EST-MCNC: 108 MG/DL
FSH SERPL-ACNC: 2.6 MIU/ML
GLOBULIN SER CALC-MCNC: 3.3 G/DL (ref 2–4)
GLUCOSE SERPL-MCNC: 107 MG/DL (ref 65–100)
HBA1C MFR BLD: 5.4 % (ref 4–5.6)
HCT VFR BLD AUTO: 43.2 % (ref 36.6–50.3)
HDLC SERPL-MCNC: 46 MG/DL
HDLC SERPL: 4.6 (ref 0–5)
HGB BLD-MCNC: 14.4 G/DL (ref 12.1–17)
LDLC SERPL CALC-MCNC: 106.6 MG/DL (ref 0–100)
LH SERPL-ACNC: 4.2 MIU/ML
MCH RBC QN AUTO: 27.4 PG (ref 26–34)
MCHC RBC AUTO-ENTMCNC: 33.3 G/DL (ref 30–36.5)
MCV RBC AUTO: 82.3 FL (ref 80–99)
NRBC # BLD: 0 K/UL (ref 0–0.01)
NRBC BLD-RTO: 0 PER 100 WBC
PLATELET # BLD AUTO: 243 K/UL (ref 150–400)
PMV BLD AUTO: 9.8 FL (ref 8.9–12.9)
POTASSIUM SERPL-SCNC: 4.4 MMOL/L (ref 3.5–5.1)
PROLACTIN SERPL-MCNC: 4.9 NG/ML
PROT SERPL-MCNC: 7.1 G/DL (ref 6.4–8.2)
RBC # BLD AUTO: 5.25 M/UL (ref 4.1–5.7)
SODIUM SERPL-SCNC: 137 MMOL/L (ref 136–145)
TESTOST FREE SERPL-MCNC: 17.9 PG/ML (ref 8.7–25.1)
TESTOST SERPL-MCNC: 263 NG/DL (ref 264–916)
TRIGL SERPL-MCNC: 287 MG/DL
VLDLC SERPL CALC-MCNC: 57.4 MG/DL
WBC # BLD AUTO: 5.5 K/UL (ref 4.1–11.1)